# Patient Record
Sex: FEMALE | Race: BLACK OR AFRICAN AMERICAN | Employment: UNEMPLOYED | URBAN - METROPOLITAN AREA
[De-identification: names, ages, dates, MRNs, and addresses within clinical notes are randomized per-mention and may not be internally consistent; named-entity substitution may affect disease eponyms.]

---

## 2021-03-08 ENCOUNTER — APPOINTMENT (OUTPATIENT)
Dept: CT IMAGING | Age: 46
End: 2021-03-08
Payer: COMMERCIAL

## 2021-03-08 ENCOUNTER — HOSPITAL ENCOUNTER (EMERGENCY)
Age: 46
Discharge: HOME | End: 2021-03-08
Attending: EMERGENCY MEDICINE
Payer: COMMERCIAL

## 2021-03-08 ENCOUNTER — APPOINTMENT (OUTPATIENT)
Dept: GENERAL RADIOLOGY | Age: 46
End: 2021-03-08
Payer: COMMERCIAL

## 2021-03-08 VITALS
HEART RATE: 104 BPM | SYSTOLIC BLOOD PRESSURE: 139 MMHG | TEMPERATURE: 98.8 F | OXYGEN SATURATION: 100 % | DIASTOLIC BLOOD PRESSURE: 101 MMHG | RESPIRATION RATE: 18 BRPM | WEIGHT: 114 LBS

## 2021-03-08 DIAGNOSIS — M54.12 CERVICAL RADICULOPATHY: Primary | ICD-10-CM

## 2021-03-08 PROCEDURE — 93005 ELECTROCARDIOGRAM TRACING: CPT | Performed by: STUDENT IN AN ORGANIZED HEALTH CARE EDUCATION/TRAINING PROGRAM

## 2021-03-08 PROCEDURE — 72125 CT NECK SPINE W/O DYE: CPT

## 2021-03-08 PROCEDURE — 73030 X-RAY EXAM OF SHOULDER: CPT

## 2021-03-08 PROCEDURE — 73110 X-RAY EXAM OF WRIST: CPT

## 2021-03-08 PROCEDURE — 99283 EMERGENCY DEPT VISIT LOW MDM: CPT

## 2021-03-08 PROCEDURE — 6370000000 HC RX 637 (ALT 250 FOR IP): Performed by: STUDENT IN AN ORGANIZED HEALTH CARE EDUCATION/TRAINING PROGRAM

## 2021-03-08 RX ORDER — GABAPENTIN 100 MG/1
100 CAPSULE ORAL 2 TIMES DAILY
Qty: 12 CAPSULE | Refills: 0 | Status: SHIPPED | OUTPATIENT
Start: 2021-03-08 | End: 2022-07-18

## 2021-03-08 RX ORDER — CYCLOBENZAPRINE HCL 10 MG
10 TABLET ORAL ONCE
Status: COMPLETED | OUTPATIENT
Start: 2021-03-08 | End: 2021-03-08

## 2021-03-08 RX ADMIN — CYCLOBENZAPRINE 10 MG: 10 TABLET, FILM COATED ORAL at 12:37

## 2021-03-08 ASSESSMENT — ENCOUNTER SYMPTOMS
VOMITING: 0
SHORTNESS OF BREATH: 0
NAUSEA: 0
SORE THROAT: 0
ABDOMINAL PAIN: 0
RHINORRHEA: 0
PHOTOPHOBIA: 0

## 2021-03-08 ASSESSMENT — PAIN DESCRIPTION - PAIN TYPE: TYPE: ACUTE PAIN

## 2021-03-08 ASSESSMENT — PAIN DESCRIPTION - ORIENTATION: ORIENTATION: LEFT

## 2021-03-08 ASSESSMENT — PAIN DESCRIPTION - DESCRIPTORS: DESCRIPTORS: ACHING

## 2021-03-08 NOTE — ED PROVIDER NOTES
[facility]  ED  Emergency Department Encounter  EmergencyMedicine Resident     Pt Name:Lu Hua  MRN: [id number]  [place] 1975  Date of evaluation: 3/8/21  PCP:  No primary care provider on file. CHIEF COMPLAINT       Chief Complaint   Patient presents with    Shoulder Pain    Wrist Pain       HISTORY OF PRESENT ILLNESS  (Location/Symptom, Timing/Onset, Context/Setting, Quality, Duration, Modifying Factors, Severity.)      Jose Luis Simon is a 50 y.o. female who presents with a 1 week history of left-sided shoulder and arm pain. Patient states that she has been taking it because she is having paresthesias. And lancinating type sharp pain patient is been taking ibuprofen and Tylenol with minimal improvement. Patient denies any trauma she denies any neck pain any fevers any chills chest pain or shortness of breath. Not any instrumentation on her spine no IV drug use no history of steroids or malignancy. PAST MEDICAL / SURGICAL / SOCIAL / FAMILY HISTORY      has no past medical history on file. has no past surgical history on file.   Ovarian cysts    Social History     Socioeconomic History    Marital status: Unknown     Spouse name: Not on file    Number of children: Not on file    Years of education: Not on file    Highest education level: Not on file   Occupational History    Not on file   Social Needs    Financial resource strain: Not on file    Food insecurity     Worry: Not on file     Inability: Not on file    Transportation needs     Medical: Not on file     Non-medical: Not on file   Tobacco Use    Smoking status: Not on file   Substance and Sexual Activity    Alcohol use: Not on file    Drug use: Not on file    Sexual activity: Not on file   Lifestyle    Physical activity     Days per week: Not on file     Minutes per session: Not on file    Stress: Not on file   Relationships    Social connections     Talks on phone: Not on file     Gets together: appearing   HENT:      Head: Normocephalic and atraumatic. Right Ear: External ear normal.      Left Ear: External ear normal.      Nose: Nose normal.      Mouth/Throat:      Mouth: Mucous membranes are moist.   Eyes:      Conjunctiva/sclera: Conjunctivae normal.   Neck:      Musculoskeletal: Normal range of motion and neck supple. No neck rigidity. Cardiovascular:      Rate and Rhythm: Regular rhythm. Tachycardia present. Pulses: Normal pulses. Pulmonary:      Effort: Pulmonary effort is normal.      Breath sounds: Normal breath sounds. Abdominal:      Palpations: Abdomen is soft. Tenderness: There is no abdominal tenderness. There is no guarding. Musculoskeletal: Normal range of motion. General: Tenderness present. No swelling. Comments: Negative Belle test negative Adson's test negative Graham's test range of motion of the left shoulder pain on range of motion of the left wrist no ecchymosis no rash no external signs of injury full range of motion of the elbow able to pronate and supinate normally. Lymphadenopathy:      Cervical: No cervical adenopathy. Skin:     General: Skin is warm. Capillary Refill: Capillary refill takes less than 2 seconds. Findings: No bruising, erythema, lesion or rash. Neurological:      Mental Status: She is alert and oriented to person, place, and time. Psychiatric:         Mood and Affect: Mood normal.         DIFFERENTIAL  DIAGNOSIS     PLAN (LABS / IMAGING / EKG):  Orders Placed This Encounter   Procedures    XR SHOULDER LEFT (MIN 2 VIEWS)    XR WRIST LEFT (MIN 3 VIEWS)    CT CERVICAL SPINE WO CONTRAST    EKG 12 Lead       MEDICATIONS ORDERED:  Orders Placed This Encounter   Medications    cyclobenzaprine (FLEXERIL) tablet 10 mg    gabapentin (NEURONTIN) 100 MG capsule     Sig: Take 1 capsule by mouth 2 times daily for 6 days.  Intended supply: 90 days     Dispense:  12 capsule     Refill:  0       DDX: cervical Radiculopathy thoracic outlet syndrome doubt ACS    DIAGNOSTIC RESULTS / EMERGENCY DEPARTMENT COURSE / MDM   LAB RESULTS:  No results found for this visit on 03/08/21. IMPRESSION: Oriented nontoxic uncomfortable appearing 53-year-old female with a 1 week history of paresthesias down her left arm it is circumferential in nature not following a particular dermatomal pattern. Denies any trauma or neck pain full range of motion of her neck no meningeal signs no fevers no constitutional symptoms likely represents a cervical radiculopathy plan be for CT imaging of the neck to evaluate for any disc opinion will obtain x-ray imaging of the shoulder and left wrist flexeril for pain. RADIOLOGY:  Xr Wrist Left (min 3 Views)    Result Date: 3/8/2021  EXAMINATION: 3 XRAY VIEWS OF THE LEFT WRIST 3/8/2021 1:11 pm COMPARISON: None. HISTORY: ORDERING SYSTEM PROVIDED HISTORY: pain TECHNOLOGIST PROVIDED HISTORY: pain FINDINGS: AP, lateral, and oblique views of the wrist are submitted for review. There is no evidence for acute fracture or dislocation. Bony mineralization is normal.  Overlying soft tissues are unremarkable. No radiopaque foreign bodies are identified. No acute osseus abnormality of the wrist.     Ct Cervical Spine Wo Contrast    Result Date: 3/8/2021  EXAMINATION: CT OF THE CERVICAL SPINE WITHOUT CONTRAST 3/8/2021 12:55 pm TECHNIQUE: CT of the cervical spine was performed without the administration of intravenous contrast. Multiplanar reformatted images are provided for review. Dose modulation, iterative reconstruction, and/or weight based adjustment of the mA/kV was utilized to reduce the radiation dose to as low as reasonably achievable. COMPARISON: None.  HISTORY: ORDERING SYSTEM PROVIDED HISTORY: left sided cervical radiculopathy eval for impingement TECHNOLOGIST PROVIDED HISTORY: left sided cervical radiculopathy eval for impingement Decision Support Exception->Emergency Medical Condition (MA) Is the patient worsen    [facility] [place] [address]  [phone]  Call today  for pcp      DISCHARGE MEDICATIONS:  New Prescriptions    GABAPENTIN (NEURONTIN) 100 MG CAPSULE    Take 1 capsule by mouth 2 times daily for 6 days.  Intended supply: 90 days       Marcello Gracia DO  Emergency Medicine Resident    (Please note that portions of thisnote were completed with a voice recognition program.  Efforts were made to edit the dictations but occasionally words are mis-transcribed.)        Marcello Gracia DO  Resident  03/08/21 1671

## 2021-03-08 NOTE — ED NOTES
Pt to ED with complaint of left shoulder pain that radiates down to wrist. Pain is intermittent. Some movement improved the pain. Pt denies fall or injury. Pt denies chest pain, sob, ha, nausea, vertigo. Pt appears uncomfortable.      Jonathan Myers RN  03/08/21 4133

## 2021-03-09 LAB
EKG ATRIAL RATE: 86 BPM
EKG P AXIS: 71 DEGREES
EKG P-R INTERVAL: 160 MS
EKG Q-T INTERVAL: 382 MS
EKG QRS DURATION: 72 MS
EKG QTC CALCULATION (BAZETT): 457 MS
EKG R AXIS: 47 DEGREES
EKG T AXIS: 82 DEGREES
EKG VENTRICULAR RATE: 86 BPM

## 2021-06-13 ENCOUNTER — HOSPITAL ENCOUNTER (EMERGENCY)
Age: 46
Discharge: HOME | End: 2021-06-13
Attending: EMERGENCY MEDICINE
Payer: COMMERCIAL

## 2021-06-13 ENCOUNTER — APPOINTMENT (OUTPATIENT)
Dept: GENERAL RADIOLOGY | Age: 46
End: 2021-06-13
Payer: COMMERCIAL

## 2021-06-13 ENCOUNTER — APPOINTMENT (OUTPATIENT)
Dept: CT IMAGING | Age: 46
End: 2021-06-13
Payer: COMMERCIAL

## 2021-06-13 VITALS
BODY MASS INDEX: 21.71 KG/M2 | RESPIRATION RATE: 26 BRPM | SYSTOLIC BLOOD PRESSURE: 152 MMHG | WEIGHT: 115 LBS | HEIGHT: 61 IN | TEMPERATURE: 97.2 F | OXYGEN SATURATION: 100 % | HEART RATE: 77 BPM | DIASTOLIC BLOOD PRESSURE: 92 MMHG

## 2021-06-13 DIAGNOSIS — R07.9 LEFT-SIDED CHEST PAIN: Primary | ICD-10-CM

## 2021-06-13 LAB
ABSOLUTE EOS #: 0.58 K/UL (ref 0–0.44)
ABSOLUTE IMMATURE GRANULOCYTE: <0.03 K/UL (ref 0–0.3)
ABSOLUTE LYMPH #: 3.18 K/UL (ref 1.1–3.7)
ABSOLUTE MONO #: 0.78 K/UL (ref 0.1–1.2)
ANION GAP SERPL CALCULATED.3IONS-SCNC: 11 MMOL/L (ref 9–17)
BASOPHILS # BLD: 1 % (ref 0–2)
BASOPHILS ABSOLUTE: 0.06 K/UL (ref 0–0.2)
BUN BLDV-MCNC: 13 MG/DL (ref 6–20)
BUN/CREAT BLD: ABNORMAL (ref 9–20)
CALCIUM SERPL-MCNC: 9 MG/DL (ref 8.6–10.4)
CHLORIDE BLD-SCNC: 106 MMOL/L (ref 98–107)
CO2: 19 MMOL/L (ref 20–31)
CREAT SERPL-MCNC: 0.72 MG/DL (ref 0.5–0.9)
D-DIMER QUANTITATIVE: 0.51 MG/L FEU
DIFFERENTIAL TYPE: ABNORMAL
EOSINOPHILS RELATIVE PERCENT: 8 % (ref 1–4)
GFR AFRICAN AMERICAN: >60 ML/MIN
GFR NON-AFRICAN AMERICAN: >60 ML/MIN
GFR SERPL CREATININE-BSD FRML MDRD: ABNORMAL ML/MIN/{1.73_M2}
GFR SERPL CREATININE-BSD FRML MDRD: ABNORMAL ML/MIN/{1.73_M2}
GLUCOSE BLD-MCNC: 92 MG/DL (ref 70–99)
HCT VFR BLD CALC: 42 % (ref 36.3–47.1)
HEMOGLOBIN: 14.6 G/DL (ref 11.9–15.1)
IMMATURE GRANULOCYTES: 0 %
LYMPHOCYTES # BLD: 42 % (ref 24–43)
MCH RBC QN AUTO: 32.3 PG (ref 25.2–33.5)
MCHC RBC AUTO-ENTMCNC: 34.8 G/DL (ref 28.4–34.8)
MCV RBC AUTO: 92.9 FL (ref 82.6–102.9)
MONOCYTES # BLD: 11 % (ref 3–12)
NRBC AUTOMATED: 0 PER 100 WBC
PDW BLD-RTO: 12.7 % (ref 11.8–14.4)
PLATELET # BLD: 320 K/UL (ref 138–453)
PLATELET ESTIMATE: ABNORMAL
PMV BLD AUTO: 9.8 FL (ref 8.1–13.5)
POTASSIUM SERPL-SCNC: 4 MMOL/L (ref 3.7–5.3)
RBC # BLD: 4.52 M/UL (ref 3.95–5.11)
RBC # BLD: ABNORMAL 10*6/UL
SEG NEUTROPHILS: 38 % (ref 36–65)
SEGMENTED NEUTROPHILS ABSOLUTE COUNT: 2.8 K/UL (ref 1.5–8.1)
SODIUM BLD-SCNC: 136 MMOL/L (ref 135–144)
TROPONIN INTERP: NORMAL
TROPONIN T: NORMAL NG/ML
TROPONIN, HIGH SENSITIVITY: <6 NG/L (ref 0–14)
WBC # BLD: 7.4 K/UL (ref 3.5–11.3)
WBC # BLD: ABNORMAL 10*3/UL

## 2021-06-13 PROCEDURE — 71260 CT THORAX DX C+: CPT

## 2021-06-13 PROCEDURE — 84484 ASSAY OF TROPONIN QUANT: CPT

## 2021-06-13 PROCEDURE — 99284 EMERGENCY DEPT VISIT MOD MDM: CPT

## 2021-06-13 PROCEDURE — 80048 BASIC METABOLIC PNL TOTAL CA: CPT

## 2021-06-13 PROCEDURE — 85379 FIBRIN DEGRADATION QUANT: CPT

## 2021-06-13 PROCEDURE — 6360000004 HC RX CONTRAST MEDICATION: Performed by: STUDENT IN AN ORGANIZED HEALTH CARE EDUCATION/TRAINING PROGRAM

## 2021-06-13 PROCEDURE — 85025 COMPLETE CBC W/AUTO DIFF WBC: CPT

## 2021-06-13 PROCEDURE — 6360000002 HC RX W HCPCS: Performed by: STUDENT IN AN ORGANIZED HEALTH CARE EDUCATION/TRAINING PROGRAM

## 2021-06-13 PROCEDURE — 93005 ELECTROCARDIOGRAM TRACING: CPT | Performed by: STUDENT IN AN ORGANIZED HEALTH CARE EDUCATION/TRAINING PROGRAM

## 2021-06-13 PROCEDURE — 71046 X-RAY EXAM CHEST 2 VIEWS: CPT

## 2021-06-13 PROCEDURE — 96374 THER/PROPH/DIAG INJ IV PUSH: CPT

## 2021-06-13 PROCEDURE — 96375 TX/PRO/DX INJ NEW DRUG ADDON: CPT

## 2021-06-13 RX ORDER — IBUPROFEN 600 MG/1
600 TABLET ORAL EVERY 6 HOURS PRN
Qty: 15 TABLET | Refills: 0 | Status: SHIPPED | OUTPATIENT
Start: 2021-06-13 | End: 2022-08-17

## 2021-06-13 RX ORDER — KETOROLAC TROMETHAMINE 15 MG/ML
15 INJECTION, SOLUTION INTRAMUSCULAR; INTRAVENOUS ONCE
Status: COMPLETED | OUTPATIENT
Start: 2021-06-13 | End: 2021-06-13

## 2021-06-13 RX ORDER — FENTANYL CITRATE 50 UG/ML
50 INJECTION, SOLUTION INTRAMUSCULAR; INTRAVENOUS ONCE
Status: COMPLETED | OUTPATIENT
Start: 2021-06-13 | End: 2021-06-13

## 2021-06-13 RX ORDER — LIDOCAINE 50 MG/G
1 PATCH TOPICAL DAILY
Qty: 10 PATCH | Refills: 0 | Status: SHIPPED | OUTPATIENT
Start: 2021-06-13 | End: 2021-06-23

## 2021-06-13 RX ORDER — ACETAMINOPHEN 325 MG/1
650 TABLET ORAL EVERY 6 HOURS PRN
Qty: 20 TABLET | Refills: 0 | Status: SHIPPED | OUTPATIENT
Start: 2021-06-13

## 2021-06-13 RX ADMIN — KETOROLAC TROMETHAMINE 15 MG: 15 INJECTION, SOLUTION INTRAMUSCULAR; INTRAVENOUS at 14:56

## 2021-06-13 RX ADMIN — FENTANYL CITRATE 50 MCG: 50 INJECTION, SOLUTION INTRAMUSCULAR; INTRAVENOUS at 16:02

## 2021-06-13 RX ADMIN — IOPAMIDOL 75 ML: 755 INJECTION, SOLUTION INTRAVENOUS at 17:11

## 2021-06-13 ASSESSMENT — PAIN DESCRIPTION - PAIN TYPE: TYPE: ACUTE PAIN

## 2021-06-13 ASSESSMENT — PAIN SCALES - GENERAL
PAINLEVEL_OUTOF10: 9
PAINLEVEL_OUTOF10: 10
PAINLEVEL_OUTOF10: 9

## 2021-06-13 ASSESSMENT — ENCOUNTER SYMPTOMS
NAUSEA: 0
VOMITING: 0
DIARRHEA: 0
WHEEZING: 0
COUGH: 0
ABDOMINAL PAIN: 0
SHORTNESS OF BREATH: 1

## 2021-06-13 ASSESSMENT — PAIN DESCRIPTION - ORIENTATION: ORIENTATION: LEFT

## 2021-06-13 ASSESSMENT — PAIN DESCRIPTION - LOCATION: LOCATION: RIB CAGE

## 2021-06-13 NOTE — ED PROVIDER NOTES
[facility]  Emergency Department  Emergency Medicine Resident Sign-out     Care of Rebecca Tran was assumed from Dr. Smitha Romero and is being seen for Rib Pain (Left side rib pain, states \"It feels like my meat is caught or something\". Pt reports pain when she takes a deep breath) and Shortness of Breath  . The patient's initial evaluation and plan have been discussed with the prior provider who initially evaluated the patient. EMERGENCY DEPARTMENT COURSE / MEDICAL DECISION MAKING:       MEDICATIONS GIVEN:  Orders Placed This Encounter   Medications    ketorolac (TORADOL) injection 15 mg    fentaNYL (SUBLIMAZE) injection 50 mcg    acetaminophen (TYLENOL) 325 MG tablet     Sig: Take 2 tablets by mouth every 6 hours as needed for Pain     Dispense:  20 tablet     Refill:  0    ibuprofen (IBU) 600 MG tablet     Sig: Take 1 tablet by mouth every 6 hours as needed for Pain     Dispense:  15 tablet     Refill:  0    lidocaine (LIDODERM) 5 %     Sig: Place 1 patch onto the skin daily for 10 days 12 hours on, 12 hours off. Dispense:  10 patch     Refill:  0    iopamidol (ISOVUE-370) 76 % injection 75 mL       LABS / RADIOLOGY:     Labs Reviewed   CBC WITH AUTO DIFFERENTIAL - Abnormal; Notable for the following components:       Result Value    Eosinophils % 8 (*)     Absolute Eos # 0.58 (*)     All other components within normal limits   BASIC METABOLIC PANEL W/ REFLEX TO MG FOR LOW K - Abnormal; Notable for the following components:    CO2 19 (*)     All other components within normal limits   TROPONIN   D-DIMER, QUANTITATIVE       XR CHEST (2 VW)    Result Date: 6/13/2021  EXAMINATION: TWO XRAY VIEWS OF THE CHEST 6/13/2021 2:16 pm COMPARISON: None.  HISTORY: ORDERING SYSTEM PROVIDED HISTORY: chest pain left lateral, rule out pneumonia vs rib fx vs pneumothorax TECHNOLOGIST PROVIDED HISTORY: chest pain left lateral, rule out pneumonia vs rib fx vs pneumothorax FINDINGS: Upright frontal and lateral view chest radiographs were obtained. The heart size, mediastinal contour and pleural spaces are within normal limits. The lungs are clear. There is no focal consolidation or pneumothorax. The pulmonary vascular pattern is within normal limits. No significant thoracic osseous abnormality. Clear lungs. No acute cardiopulmonary abnormality. RECENT VITALS:     Temp: 97.2 °F (36.2 °C),  Pulse: 77, Resp: 26, BP: (!) 152/92, SpO2: 100 %      This patient is a 48 y.o. Female with left rib/side being. Ongoing for several days. Work-up thus far unremarkable however she is on estradiol after total hysterectomy. Awaiting CT scan to rule out pulmonary embolism at this time. Plan is for discharge if CT is negative. CT scan is unremarkable. Will discharge patient with nonnarcotic analgesia, incentive spirometer. Discussed supportive care measures. Strict return precautions given. Patient instructed to follow with her primary care provider as soon as possible for follow up. Patient and/or family expressed understanding and agreement with this plan. ED Course as of Jun 13 1955   Sun Jun 13, 2021   1500 WBC: 7.4 [ZT]   1500 Hemoglobin Quant: 14.6 [ZT]   1646 BMP unremarkable. Normal renal function. No electrolyte abnormalities. Troponin less than 6. Patient overall is low risk. Repeat troponin is not indicated. D-dimer slightly elevated 0.51. CT PE pending. No leukocytosis. Normal hemoglobin. No significant improvement after Toradol was given fentanyl. [ZT]   3827 Chest x-ray unremarkable. No pneumothorax. No pneumonia. [ZT]      ED Course User Index  [ZT] Amanda Tate DO       OUTSTANDING TASKS / RECOMMENDATIONS:    1. F/u CT     FINAL IMPRESSION:     1.  Left-sided chest pain        DISPOSITION:         DISPOSITION:  [x]  Discharge   []  Transfer -    []  Admission -     []  Against Medical Advice   []  Eloped   FOLLOW-UP: Viola Christianson MD  [address] [place] [place] [address]  [phone]    Schedule an appointment as soon as possible for a visit       [facility] ED  [address] [facility] [address]  [phone]    As needed, If symptoms worsen     DISCHARGE MEDICATIONS: Discharge Medication List as of 6/13/2021  6:29 PM      START taking these medications    Details   acetaminophen (TYLENOL) 325 MG tablet Take 2 tablets by mouth every 6 hours as needed for Pain, Disp-20 tablet, R-0Print      ibuprofen (IBU) 600 MG tablet Take 1 tablet by mouth every 6 hours as needed for Pain, Disp-15 tablet, R-0Print      lidocaine (LIDODERM) 5 % Place 1 patch onto the skin daily for 10 days 12 hours on, 12 hours off., Disp-10 patch, R-0Print                Baron Limon,   Emergency Medicine Resident  Veverly Salvatore Limon, [address] [place] [address]  Resident  06/13/21 6716

## 2021-06-13 NOTE — ED PROVIDER NOTES
Faculty Sign-Out Attestation  Handoff taken on the following patient from prior Attending Physician: Sandra Yuen    I was available and discussed any additional care issues that arose and coordinated the management plans with the resident(s) caring for the patient during my duty period. Any areas of disagreement with residents documentation of care or procedures are noted on the chart. I was personally present for the key portions of any/all procedures during my duty period. I have documented in the chart those procedures where I was not present during the key portions. CT CHEST PULMONARY EMBOLISM W CONTRAST   Final Result   No evidence of pulmonary embolism . Mild linear atelectatic change of the left lung base with adjacent small left   pleural effusion. Multiple subcentimeter cystic changes are noted within the right lung. .         XR CHEST (2 VW)   Final Result   Clear lungs. No acute cardiopulmonary abnormality.                   Daria Dandy, MD  Attending Physician       Daria Dandy, MD  06/13/21 3505

## 2021-06-13 NOTE — ED PROVIDER NOTES
[facility]  ED  Emergency Department  Senior Resident Attestation     Primary Care Physician  Rich Ty MD    I performed a history and physical examination of the patient and discussed management with the chong resident. I reviewed the chong residents note and agree with the documented findings and plan of care. Any areas of disagreement are noted on the chart. Case was then discussed with Faculty Attending Supervisor for additional medical management. PERTINENT ATTENDING PHYSICIAN COMMENTS:    HISTORY:   Shanna Tellez is a 48 y.o. female who  has no past medical history on file. and presents with complaint of left-sided chest pain. Patient states that this is been ongoing and progressively getting worse over the last 5 days. Patient stated it \"feels as if her meat is caught\". Pain is worse with movement, palpation, cough, laughing. Denies any history of DVT/PE, denies any risk factors except she does take estradiol. She denies any preceding viral URI. Denies any sick contacts, denies loss of taste or smell, denies trauma.     PHYSICAL:   Temp: 97.2 °F (36.2 °C),  Pulse: 90, Resp: 18, BP: (!) 152/92, SpO2: 99 %  Gen: Non-toxic, Afebrile, appears uncomfortable  Neck: Supple  Cards: Regular rate and rhythm, reproducible chest pain over the left ribs, no paradoxical movement, crepitus  Pulm: Lung sounds clear to auscultation  Abdomen: Soft, non-tender, non-distended  Skin: warm, dry  Extremities: pulses 2+ radial / dorsalis pedis, no clubbing, cyanosis, edema, no calf tenderness    IMPRESSION:   Left-sided pleuritic chest pain    PLAN:   Cardiac labs, D-dimer, pain control, chest x-ray, EKG    CRITICAL CARE TIME:    None    Makeda Bowman DO  Senior Resident Physician    (Please note that portions of this note were completed with a voice recognition program.  Efforts were made to edit the dictations but occasionally words are mis-transcribed.)       Makeda Bowman DO  Resident  06/13/21 1522

## 2021-06-13 NOTE — ED PROVIDER NOTES
[facility]  ED  Emergency Department Encounter  EmergencyMedicine Resident     Pt Name:Lu Castellanos  MRN: [id number]  [place] 1975  Date of evaluation: 6/13/21  PCP:  Logan Goodell, MD    [address] [place] [address]       Chief Complaint   Patient presents with    Rib Pain     Left side rib pain, states \"It feels like my meat is caught or something\". Pt reports pain when she takes a deep breath    Shortness of Breath       HISTORY OF PRESENT ILLNESS  (Location/Symptom, Timing/Onset, Context/Setting, Quality, Duration, Modifying Factors, Severity.)    This patient was evaluated in the Emergency Department for symptoms described in the history of present illness. He/she was evaluated in the context of the global COVID-19 pandemic, which necessitated consideration that the patient might be at risk for infection with the SARS-CoV-2 virus that causes COVID-19. Institutional protocols and algorithms that pertain to the evaluation of patients at risk for COVID-19 are in a state of rapid change based on information released by regulatory bodies including the CDC and federal and state organizations. These policies and algorithms were followed during the patient's care in the ED. Bishop Danielle is a 48 y.o. female who was present emergency department today with complaints of left lateral chest wall pain is been present in progressively worsening over the past 5 days. Patient describes it as \"feeling as if her meat is caught. \"  Worse with movement and palpation. Also worse with inspiration and coughing. Has never had pain like this before. Described as sharp, moderate to severe in severity. No recent illness. No associated productive cough or rash. History of chickenpox as a child. No COVID-19 exposure. No history of PE or DVT but does take estradiol. PAST MEDICAL / SURGICAL / SOCIAL / FAMILY HISTORY      has no past medical history on file.      has a past surgical history that includes 12 hours on, 12 hours off. 6/13/21 6/23/21 Yes Homero Portillo DO   gabapentin (NEURONTIN) 100 MG capsule Take 1 capsule by mouth 2 times daily for 6 days. Intended supply: 90 days 3/8/21 3/14/21  Atul Tellez DO       REVIEW OF SYSTEMS    (2-9 systems for level 4, 10 or more for level 5)      Review of Systems   Constitutional: Negative for chills and fever. HENT: Negative for congestion. Eyes: Negative for visual disturbance. Respiratory: Positive for shortness of breath. Negative for cough and wheezing. Cardiovascular: Positive for chest pain. Negative for leg swelling. Gastrointestinal: Negative for abdominal pain, diarrhea, nausea and vomiting. Genitourinary: Negative for dysuria and hematuria. Musculoskeletal: Positive for neck pain (Chronic). Negative for neck stiffness. Skin: Negative for rash. Neurological: Negative for dizziness, weakness, numbness and headaches. PHYSICAL EXAM   (up to 7 for level 4, 8 or more for level 5)      INITIAL VITALS:   BP (!) 152/92   Pulse 77   Temp 97.2 °F (36.2 °C) (Oral)   Resp 26   Ht 5' 1\" (1.549 m)   Wt 115 lb (52.2 kg)   SpO2 100%   BMI 21.73 kg/m²     Physical Exam  Vitals reviewed. Constitutional:       General: She is not in acute distress. Appearance: She is ill-appearing. She is not toxic-appearing. HENT:      Head: Normocephalic and atraumatic. Nose: Nose normal.      Mouth/Throat:      Mouth: Mucous membranes are moist.   Eyes:      Extraocular Movements: Extraocular movements intact. Conjunctiva/sclera: Conjunctivae normal.   Cardiovascular:      Rate and Rhythm: Normal rate and regular rhythm. Pulses: Normal pulses. Pulmonary:      Effort: No respiratory distress. Breath sounds: No stridor. No wheezing, rhonchi or rales. Comments: Shallow inspiration limits bilaterally. No increased work of breathing. Bilateral breath sounds. Good lung sliding on point-of-care ultrasound.   Abdominal: General: There is no distension. Palpations: Abdomen is soft. Tenderness: There is no abdominal tenderness. There is no guarding or rebound. Musculoskeletal:         General: No swelling or deformity. Normal range of motion. Cervical back: Normal range of motion. No muscular tenderness. Comments: No peripheral edema or calf tenderness. Mild left-sided chest wall tenderness. No deformity. No flail chest.   Skin:     General: Skin is warm and dry. Findings: No rash. Comments: No overlying rash left chest wall. Neurological:      Mental Status: She is alert and oriented to person, place, and time. Psychiatric:         Behavior: Behavior normal.         DIFFERENTIAL  DIAGNOSIS     PLAN (LABS / IMAGING / EKG):  Orders Placed This Encounter   Procedures    XR CHEST (2 VW)    CT CHEST PULMONARY EMBOLISM W CONTRAST    CBC Auto Differential    Basic Metabolic Panel w/ Reflex to MG    Troponin    D-DIMER, QUANTITATIVE    EKG 12 Lead       MEDICATIONS ORDERED:  Orders Placed This Encounter   Medications    ketorolac (TORADOL) injection 15 mg    fentaNYL (SUBLIMAZE) injection 50 mcg    acetaminophen (TYLENOL) 325 MG tablet     Sig: Take 2 tablets by mouth every 6 hours as needed for Pain     Dispense:  20 tablet     Refill:  0    ibuprofen (IBU) 600 MG tablet     Sig: Take 1 tablet by mouth every 6 hours as needed for Pain     Dispense:  15 tablet     Refill:  0    lidocaine (LIDODERM) 5 %     Sig: Place 1 patch onto the skin daily for 10 days 12 hours on, 12 hours off.      Dispense:  10 patch     Refill:  0         DIAGNOSTIC RESULTS / EMERGENCY DEPARTMENT COURSE / MDM     Results for orders placed or performed during the hospital encounter of 06/13/21   CBC Auto Differential   Result Value Ref Range    WBC 7.4 3.5 - 11.3 k/uL    RBC 4.52 3.95 - 5.11 m/uL    Hemoglobin 14.6 11.9 - 15.1 g/dL    Hematocrit 42.0 36.3 - 47.1 %    MCV 92.9 82.6 - 102.9 fL    MCH 32.3 25.2 - 33.5 pg    MCHC 34.8 28.4 - 34.8 g/dL    RDW 12.7 11.8 - 14.4 %    Platelets 677 010 - 898 k/uL    MPV 9.8 8.1 - 13.5 fL    NRBC Automated 0.0 0.0 per 100 WBC    Differential Type NOT REPORTED     Seg Neutrophils 38 36 - 65 %    Lymphocytes 42 24 - 43 %    Monocytes 11 3 - 12 %    Eosinophils % 8 (H) 1 - 4 %    Basophils 1 0 - 2 %    Immature Granulocytes 0 0 %    Segs Absolute 2.80 1.50 - 8.10 k/uL    Absolute Lymph # 3.18 1.10 - 3.70 k/uL    Absolute Mono # 0.78 0.10 - 1.20 k/uL    Absolute Eos # 0.58 (H) 0.00 - 0.44 k/uL    Basophils Absolute 0.06 0.00 - 0.20 k/uL    Absolute Immature Granulocyte <0.03 0.00 - 0.30 k/uL    WBC Morphology NOT REPORTED     RBC Morphology NOT REPORTED     Platelet Estimate NOT REPORTED    Basic Metabolic Panel w/ Reflex to MG   Result Value Ref Range    Glucose 92 70 - 99 mg/dL    BUN 13 6 - 20 mg/dL    CREATININE 0.72 0.50 - 0.90 mg/dL    Bun/Cre Ratio NOT REPORTED 9 - 20    Calcium 9.0 8.6 - 10.4 mg/dL    Sodium 136 135 - 144 mmol/L    Potassium 4.0 3.7 - 5.3 mmol/L    Chloride 106 98 - 107 mmol/L    CO2 19 (L) 20 - 31 mmol/L    Anion Gap 11 9 - 17 mmol/L    GFR Non-African American >60 >60 mL/min    GFR African American >60 >60 mL/min    GFR Comment          GFR Staging NOT REPORTED    Troponin   Result Value Ref Range    Troponin, High Sensitivity <6 0 - 14 ng/L    Troponin T NOT REPORTED <0.03 ng/mL    Troponin Interp NOT REPORTED    D-DIMER, QUANTITATIVE   Result Value Ref Range    D-Dimer, Quant 0.51 mg/L FEU         RADIOLOGY:  XR CHEST (2 VW)   Final Result   Clear lungs. No acute cardiopulmonary abnormality.             CT CHEST PULMONARY EMBOLISM W CONTRAST    (Results Pending)        EKG  EKG Interpretation    Interpreted by me    Rhythm: normal sinus   Rate: normal  Axis: normal  Ectopy: none  Conduction: normal  ST Segments: no acute change  T Waves: no acute change  Q Waves: none    Clinical Impression: no acute changes and normal EKG    All EKG's are interpreted by the [ZT]   1646 BMP unremarkable. Normal renal function. No electrolyte abnormalities. Troponin less than 6. Patient overall is low risk. Repeat troponin is not indicated. D-dimer slightly elevated 0.51. CT PE pending. No leukocytosis. Normal hemoglobin. No significant improvement after Toradol was given fentanyl. [ZT]   3056 Chest x-ray unremarkable. No pneumothorax. No pneumonia. [ZT]      ED Course User Index  [ZT] Saji Osorio DO     Discussed with patient plan for discharge if negative CT PE study. She is agreeable. Pain is well controlled at this time. Plan to discharge on multimodal pain control with Tylenol, Motrin, lidocaine patch. Encourage incentive spirometry and follow-up with PCP. Signed out to Dr. Ramy Ghosh. FINAL IMPRESSION      1. Left-sided chest pain          DISPOSITION / PLAN     DISPOSITION        PATIENT REFERRED TO:  Pk Graves MD  [address] [place] [address]  [phone]    Schedule an appointment as soon as possible for a visit       [facility] ED  [address] [place] [address]  [phone]    As needed, If symptoms worsen      DISCHARGE MEDICATIONS:  New Prescriptions    ACETAMINOPHEN (TYLENOL) 325 MG TABLET    Take 2 tablets by mouth every 6 hours as needed for Pain    IBUPROFEN (IBU) 600 MG TABLET    Take 1 tablet by mouth every 6 hours as needed for Pain    LIDOCAINE (LIDODERM) 5 %    Place 1 patch onto the skin daily for 10 days 12 hours on, 12 hours off.        Saji Osorio DO  Emergency Medicine Resident    (Please note that portions of thisnote were completed with a voice recognition program.  Efforts were made to edit the dictations but occasionally words are mis-transcribed.)       Saji Osorio DO  Resident  06/13/21 1693

## 2021-06-13 NOTE — ED NOTES
Pt arrived to ED alert and oriented x4. Pt c/o rib pain and SOB. Pt reports left side rib pain, denies injury. Pt reports \"I feels like my meat is caught or something\". Pt reports pain when she takes a deep breath. Pt reports being recent seen for a pinched nerve. Pt denies having been around anyone suspected to have COVID-19 or anyone that has been sick, denies recent travel outside the [facility] [place] or [address]. RR even and unlabored. NAD noted. Will continue with plan of care.      Helder Gaitan RN  06/13/21 1508

## 2021-06-14 LAB
EKG ATRIAL RATE: 96 BPM
EKG P AXIS: 66 DEGREES
EKG P-R INTERVAL: 144 MS
EKG Q-T INTERVAL: 352 MS
EKG QRS DURATION: 78 MS
EKG QTC CALCULATION (BAZETT): 444 MS
EKG R AXIS: 67 DEGREES
EKG T AXIS: 51 DEGREES
EKG VENTRICULAR RATE: 96 BPM

## 2021-06-14 PROCEDURE — 93010 ELECTROCARDIOGRAM REPORT: CPT | Performed by: INTERNAL MEDICINE

## 2022-07-18 ENCOUNTER — OFFICE VISIT (OUTPATIENT)
Dept: PRIMARY CARE CLINIC | Age: 47
End: 2022-07-18
Payer: COMMERCIAL

## 2022-07-18 VITALS
HEIGHT: 61 IN | HEART RATE: 79 BPM | SYSTOLIC BLOOD PRESSURE: 158 MMHG | BODY MASS INDEX: 23.03 KG/M2 | DIASTOLIC BLOOD PRESSURE: 93 MMHG | OXYGEN SATURATION: 98 % | WEIGHT: 122 LBS

## 2022-07-18 DIAGNOSIS — R10.2 PELVIC PAIN: Primary | ICD-10-CM

## 2022-07-18 LAB
BILIRUBIN, POC: NORMAL
BLOOD URINE, POC: NORMAL
CLARITY, POC: CLEAR
COLOR, POC: YELLOW
GLUCOSE URINE, POC: NORMAL
KETONES, POC: NORMAL
LEUKOCYTE EST, POC: NORMAL
NITRITE, POC: NORMAL
PH, POC: 6
PROTEIN, POC: NORMAL
SPECIFIC GRAVITY, POC: 1.01
UROBILINOGEN, POC: 0.2

## 2022-07-18 PROCEDURE — 81003 URINALYSIS AUTO W/O SCOPE: CPT

## 2022-07-18 PROCEDURE — 99204 OFFICE O/P NEW MOD 45 MIN: CPT

## 2022-07-18 RX ORDER — TRAMADOL HYDROCHLORIDE 50 MG/1
50 TABLET ORAL EVERY 6 HOURS PRN
Qty: 20 TABLET | Refills: 0 | Status: SHIPPED | OUTPATIENT
Start: 2022-07-18 | End: 2022-07-18 | Stop reason: CLARIF

## 2022-07-18 RX ORDER — TRAMADOL HYDROCHLORIDE 50 MG/1
50 TABLET ORAL EVERY 6 HOURS PRN
Qty: 20 TABLET | Refills: 0 | Status: SHIPPED | OUTPATIENT
Start: 2022-07-18 | End: 2022-07-23

## 2022-07-18 ASSESSMENT — PATIENT HEALTH QUESTIONNAIRE - PHQ9
SUM OF ALL RESPONSES TO PHQ QUESTIONS 1-9: 0
SUM OF ALL RESPONSES TO PHQ9 QUESTIONS 1 & 2: 0
SUM OF ALL RESPONSES TO PHQ QUESTIONS 1-9: 0
2. FEELING DOWN, DEPRESSED OR HOPELESS: 0
1. LITTLE INTEREST OR PLEASURE IN DOING THINGS: 0
SUM OF ALL RESPONSES TO PHQ QUESTIONS 1-9: 0
SUM OF ALL RESPONSES TO PHQ QUESTIONS 1-9: 0

## 2022-07-18 ASSESSMENT — ENCOUNTER SYMPTOMS
CONSTIPATION: 0
NAUSEA: 0
CHEST TIGHTNESS: 0
ABDOMINAL PAIN: 1
BACK PAIN: 1
FACIAL SWELLING: 0
SHORTNESS OF BREATH: 0
SORE THROAT: 0
DIARRHEA: 0

## 2022-07-18 NOTE — PROGRESS NOTES
[facility] [address] [place] [address] IN CARE  [address] [address]  Dept: [phone]  Dept Fax: [phone]    Shwetha Finnegan is a 50 y.o. female who presents to the urgent care today for her medical conditions/complaints as notedbelow. Shwetha Finnegan is c/o of Side Pain (For the last four to five days.)      HPI:     Patient follows with a OBGYN  Patient had a hysterectomy about 3 years ago, patient has a history of ovarian cysts    Pelvic Pain  The patient's primary symptoms include pelvic pain. The patient's pertinent negatives include no genital itching, genital lesions, genital odor, genital rash, missed menses, vaginal bleeding or vaginal discharge. This is a chronic problem. The current episode started more than 1 year ago. The problem occurs intermittently (recently over the past few days it has gotten way worse). The problem has been waxing and waning. The pain is moderate. The problem affects the right side. Associated symptoms include abdominal pain, back pain and joint swelling. Pertinent negatives include no anorexia, chills, constipation, diarrhea, discolored urine, dysuria, flank pain, frequency, headaches, hematuria, joint pain, nausea, painful intercourse, rash, sore throat or urgency. Nothing aggravates the symptoms. She has tried nothing for the symptoms. She is sexually active. No, her partner does not have an STD. Contraceptive use: hysterectomy. Her past medical history is significant for ovarian cysts. There is no history of an abdominal surgery, an ectopic pregnancy, miscarriage, perineal abscess, PID, an STD, a terminated pregnancy or vaginosis. No past medical history on file. Current Outpatient Medications   Medication Sig Dispense Refill    traMADol (ULTRAM) 50 MG tablet Take 1 tablet by mouth every 6 hours as needed for Pain for up to 5 days.  20 tablet 0    acetaminophen (TYLENOL) 325 MG tablet Take 2 tablets by mouth respiratory distress. Breath sounds: Normal breath sounds. No stridor. No wheezing, rhonchi or rales. Chest:      Chest wall: No tenderness. Abdominal:      General: Abdomen is flat. Bowel sounds are normal. There is no distension. Palpations: Abdomen is soft. There is no shifting dullness, hepatomegaly, splenomegaly or pulsatile mass. Tenderness: There is no abdominal tenderness. There is no right CVA tenderness, left CVA tenderness, guarding or rebound. Negative signs include Cooper's sign, Rovsing's sign and McBurney's sign. Genitourinary:     Comments: Patient was covered during exam, right sided pelvic pain felt on palpation  Musculoskeletal:         General: No swelling, tenderness or signs of injury. Normal range of motion. Cervical back: Normal range of motion and neck supple. No rigidity or tenderness. Right lower leg: No edema. Left lower leg: No edema. Lymphadenopathy:      Cervical: No cervical adenopathy. Skin:     General: Skin is warm and dry. Capillary Refill: Capillary refill takes less than 2 seconds. Findings: No bruising, erythema, lesion or rash. Neurological:      Mental Status: She is alert and oriented to person, place, and time. Motor: No weakness. Coordination: Coordination normal.      Gait: Gait normal.   Psychiatric:         Mood and Affect: Mood normal.         Behavior: Behavior normal.         Thought Content: Thought content normal.         Judgment: Judgment normal.     BP (!) 158/93   Pulse 79   Ht 5' 1\" (1.549 m)   Wt 122 lb (55.3 kg)   SpO2 98%   BMI 23.05 kg/m²     Assessment:       Diagnosis Orders   1.  Pelvic pain  POCT Urinalysis No Micro (Auto)    US PELVIS COMPLETE    traMADol (ULTRAM) 50 MG tablet    DISCONTINUED: traMADol (ULTRAM) 50 MG tablet        Results for POC orders placed in visit on 07/18/22   POCT Urinalysis No Micro (Auto)   Result Value Ref Range    Color, UA yellow     Clarity, UA clear Glucose, UA POC neg     Bilirubin, UA neg     Ketones, UA neg     Spec Grav, UA 1.015     Blood, UA POC neg     pH, UA 6.0     Protein, UA POC neg     Urobilinogen, UA 0.2     Leukocytes, UA neg     Nitrite, UA neg        Plan:   -urinalysis unremarkable, reviewed with patient  -US pelvic for pelvic pain, will call with results  -Advised patient to follow up with OBGYN for further management  -increase fluids and rest  -Avoid spicy/processed foods and carbonated beverages  -Advise to refrain from sex for now  -OARRS reviewed  -tramadol as needed for pain  -May use tylenol and ibuprofen for pain  -Go to ER if pain becomes severe, fever, or SOB  -Patient agreeable with treatment plan  No follow-ups on file. Orders Placed This Encounter   Medications    DISCONTD: traMADol (ULTRAM) 50 MG tablet     Sig: Take 1 tablet by mouth every 6 hours as needed for Pain for up to 5 days. Dispense:  20 tablet     Refill:  0     Reduce doses taken as pain becomes manageable    traMADol (ULTRAM) 50 MG tablet     Sig: Take 1 tablet by mouth every 6 hours as needed for Pain for up to 5 days. Dispense:  20 tablet     Refill:  0     Reduce doses taken as pain becomes manageable           Patient given educational materials - see patient instructions. Discussed use, benefit, and side effects of prescribed medications. All patient questions answered. Pt voiced understanding.     Electronically signed by DAVIN Monson NP on 7/18/2022 at 1:38 PM

## 2022-07-18 NOTE — LETTER
[facility]  [address] [address]  Phone: [phone]  Fax: [phone]    DAVIN Pereira NP        July 18, 2022     Patient: Aubrey Deluca   YOB: 1975   Date of Visit: 7/18/2022       To Whom It May Concern: It is my medical opinion that Larry Fischer may return to work on 7/21/22 . If you have any questions or concerns, please don't hesitate to call.     Sincerely,        DAVIN Pereira NP

## 2022-07-19 ENCOUNTER — HOSPITAL ENCOUNTER (OUTPATIENT)
Dept: ULTRASOUND IMAGING | Age: 47
Discharge: HOME | End: 2022-07-21
Payer: COMMERCIAL

## 2022-07-19 DIAGNOSIS — R10.2 PELVIC PAIN: ICD-10-CM

## 2022-07-19 PROCEDURE — 76856 US EXAM PELVIC COMPLETE: CPT

## 2022-08-17 ENCOUNTER — HOSPITAL ENCOUNTER (OUTPATIENT)
Age: 47
Setting detail: SPECIMEN
Discharge: HOME | End: 2022-08-17

## 2022-08-17 ENCOUNTER — OFFICE VISIT (OUTPATIENT)
Dept: PRIMARY CARE CLINIC | Age: 47
End: 2022-08-17
Payer: COMMERCIAL

## 2022-08-17 VITALS
BODY MASS INDEX: 23.05 KG/M2 | TEMPERATURE: 98.1 F | DIASTOLIC BLOOD PRESSURE: 100 MMHG | OXYGEN SATURATION: 99 % | SYSTOLIC BLOOD PRESSURE: 149 MMHG | WEIGHT: 122 LBS | HEART RATE: 87 BPM

## 2022-08-17 DIAGNOSIS — R10.31 BILATERAL LOWER ABDOMINAL PAIN: ICD-10-CM

## 2022-08-17 DIAGNOSIS — Z12.11 COLON CANCER SCREENING: ICD-10-CM

## 2022-08-17 DIAGNOSIS — Z13.220 SCREENING FOR HYPERLIPIDEMIA: Primary | ICD-10-CM

## 2022-08-17 DIAGNOSIS — R10.2 PELVIC PAIN: ICD-10-CM

## 2022-08-17 DIAGNOSIS — R10.32 BILATERAL LOWER ABDOMINAL PAIN: ICD-10-CM

## 2022-08-17 PROCEDURE — 99214 OFFICE O/P EST MOD 30 MIN: CPT | Performed by: NURSE PRACTITIONER

## 2022-08-17 PROCEDURE — G8420 CALC BMI NORM PARAMETERS: HCPCS | Performed by: NURSE PRACTITIONER

## 2022-08-17 PROCEDURE — 4004F PT TOBACCO SCREEN RCVD TLK: CPT | Performed by: NURSE PRACTITIONER

## 2022-08-17 PROCEDURE — G8427 DOCREV CUR MEDS BY ELIG CLIN: HCPCS | Performed by: NURSE PRACTITIONER

## 2022-08-17 RX ORDER — ESTRADIOL 0.1 MG/D
1 FILM, EXTENDED RELEASE TRANSDERMAL
COMMUNITY

## 2022-08-17 RX ORDER — IBUPROFEN 800 MG/1
TABLET ORAL
COMMUNITY
Start: 2022-07-24

## 2022-08-17 SDOH — ECONOMIC STABILITY: FOOD INSECURITY: WITHIN THE PAST 12 MONTHS, YOU WORRIED THAT YOUR FOOD WOULD RUN OUT BEFORE YOU GOT MONEY TO BUY MORE.: NEVER TRUE

## 2022-08-17 SDOH — ECONOMIC STABILITY: FOOD INSECURITY: WITHIN THE PAST 12 MONTHS, THE FOOD YOU BOUGHT JUST DIDN'T LAST AND YOU DIDN'T HAVE MONEY TO GET MORE.: NEVER TRUE

## 2022-08-17 SDOH — ECONOMIC STABILITY: TRANSPORTATION INSECURITY
IN THE PAST 12 MONTHS, HAS THE LACK OF TRANSPORTATION KEPT YOU FROM MEDICAL APPOINTMENTS OR FROM GETTING MEDICATIONS?: NO

## 2022-08-17 SDOH — ECONOMIC STABILITY: TRANSPORTATION INSECURITY
IN THE PAST 12 MONTHS, HAS LACK OF TRANSPORTATION KEPT YOU FROM MEETINGS, WORK, OR FROM GETTING THINGS NEEDED FOR DAILY LIVING?: NO

## 2022-08-17 ASSESSMENT — ENCOUNTER SYMPTOMS
BLOOD IN STOOL: 0
TROUBLE SWALLOWING: 0
SHORTNESS OF BREATH: 0
VOMITING: 0
WHEEZING: 0
ABDOMINAL PAIN: 1
DIARRHEA: 0

## 2022-08-17 ASSESSMENT — PATIENT HEALTH QUESTIONNAIRE - PHQ9
SUM OF ALL RESPONSES TO PHQ QUESTIONS 1-9: 0
SUM OF ALL RESPONSES TO PHQ QUESTIONS 1-9: 0
2. FEELING DOWN, DEPRESSED OR HOPELESS: 0
SUM OF ALL RESPONSES TO PHQ QUESTIONS 1-9: 0
SUM OF ALL RESPONSES TO PHQ QUESTIONS 1-9: 0
SUM OF ALL RESPONSES TO PHQ9 QUESTIONS 1 & 2: 0
1. LITTLE INTEREST OR PLEASURE IN DOING THINGS: 0

## 2022-08-17 ASSESSMENT — SOCIAL DETERMINANTS OF HEALTH (SDOH): HOW HARD IS IT FOR YOU TO PAY FOR THE VERY BASICS LIKE FOOD, HOUSING, MEDICAL CARE, AND HEATING?: NOT HARD AT ALL

## 2022-08-17 NOTE — PROGRESS NOTES
[facility]  [address] [facility] [address]  Dept: [phone]  Dept Fax: [phone]    Patient Care Team:  DAVIN Kyle - CNP as PCP - General (Family Medicine)    2022    Tyrone Montano (:  1975) amrita 50 y.o. female, here for evaluation of the following medical concerns:   Chief Complaint   Patient presents with    New Patient     [facility]    Back Pain         Following with until [facility] until recently, felt symptoms were not being checked. Generally new to [facility] ER about 2 weeks ago for lower abdominal pain, she had abnormal scan is scheduled to see both urology as well as GI. Needs MRCP. C/O abd pain. Started on the left  3 months ago, now on right about 1 month ago  Lifting and driving, along with coughing increases the pain. Not improved with Bowel movements. No appetite loss, no weight loss  Denies bloody stools or hematuria, no vaginal discharge    Thomas Scott is asking me to complete paperwork for jobs and family services, at this time she is to supposed to work in order to [employer]. The pain limits her from sitting for up to an hour and/or standing for up to an hour. She also finds lifting and or pushing painful. Review of Systems   Constitutional:  Negative for appetite change, fever and unexpected weight change. HENT:  Negative for hearing loss and trouble swallowing. Eyes:  Negative for visual disturbance. Respiratory:  Negative for shortness of breath and wheezing. Cardiovascular:  Negative for chest pain and palpitations. Gastrointestinal:  Positive for abdominal pain. Negative for blood in stool, diarrhea and vomiting. Endocrine: Negative for polydipsia and polyuria. Genitourinary:  Positive for pelvic pain. Negative for frequency, hematuria and menstrual problem. Musculoskeletal:  Negative for myalgias and neck pain.    Neurological: Negative for seizures, numbness and headaches. Psychiatric/Behavioral:  Negative for suicidal ideas. The patient is not nervous/anxious. Prior to Visit Medications    Medication Sig Taking? Authorizing Provider   estradiol (VIVELLE) 0.1 MG/24HR Place 1 patch onto the skin Twice a Week Yes Historical Provider, MD   ibuprofen (ADVIL;MOTRIN) 800 MG tablet take 1 tablet by mouth three times a day Yes Historical Provider, MD   acetaminophen (TYLENOL) 325 MG tablet Take 2 tablets by mouth every 6 hours as needed for Pain Yes Sara Nett, DO        Allergies   Allergen Reactions    Ampicillin        Past Medical History:   Diagnosis Date    Cysts of both ovaries        Past Surgical History:   Procedure Laterality Date     SECTION      HYSTERECTOMY (CERVIX STATUS UNKNOWN)         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    Sexual activity: Not on file   Other Topics Concern    Not on file   Social History Narrative    Not on file     Social Determinants of Health     Financial Resource Strain: Low Risk     Difficulty of Paying Living Expenses: Not hard at all   Food Insecurity: No Food Insecurity    Worried About Running Out of Food in the Last Year: Never true    Ran Out of Food in the Last Year: Never true   Transportation Needs: No Transportation Needs    Lack of Transportation (Medical): No    Lack of Transportation (Non-Medical):  No   Physical Activity: Not on file   Stress: Not on file   Social Connections: Not on file   Intimate Partner Violence: Not on file   Housing Stability: Not on file        Family History   Problem Relation Age of Onset    Heart Disease Mother     Heart Disease Brother 50    Cancer Neg Hx        Vitals:    08/17/22 1414 08/17/22 1420   BP: (!) 160/96 (!) 149/100   Site: Left Upper Arm Right Upper Arm   Position: Sitting Sitting   Pulse: 84 87   Temp: 98.1 °F (36.7 °C)    TempSrc: Infrared    SpO2: 99%    Weight: 122 lb (55.3 kg)      ABDOMEN AND PELVIS CT WITH CONTRAST     HISTORY: Right lower quadrant pain     COMPARISON: None. TECHNIQUE: CT abdomen and pelvis was performed. Axial images were obtained following the uneventful administration of 100 cc Omnipaque 300 nonionic intravenous contrast. Coronal and sagittal reformatted images as well as delayed excretory phase images were obtained and reviewed. Automated exposure   control was utilized     FINDINGS:     ABDOMEN:     Liver, spleen, gallbladder unremarkable. Pancreas has a normal appearing parenchyma. However, the duct is diffusely dilated measuring 5 mm. MRCP or ERCP would be recommended to exclude ampullary lesion. Adrenal glands and kidneys are unremarkable. Incidental small bilateral renal cysts which do    not require follow-up     PELVIS:     No free air or free fluid. No adenopathy. No evidence of bowel obstruction. Evidence of appendectomy. Lumbar Spine is unremarkable     IMPRESSION:     1. No acute pathology. Diffuse dilatation of pancreatic ducts for which ERCPor MRCP is recommended       Estimated body mass index is 23.05 kg/m² as calculated from the following:    Height as of 7/18/22: 5' 1\" (1.549 m). Weight as of this encounter: 122 lb (55.3 kg). Physical Exam  Vitals and nursing note reviewed. Constitutional:       General: She is not in acute distress. Appearance: She is well-developed. HENT:      Head: Normocephalic. Eyes:      General: No scleral icterus.      Pupils: Pupils are equal, round, and reactive to light. Cardiovascular:      Rate and Rhythm: Normal rate and regular rhythm. Pulmonary:      Effort: Pulmonary effort is normal.      Breath sounds: Normal breath sounds. Abdominal:      Palpations: Abdomen is soft. Tenderness: There is no abdominal tenderness. There is no guarding. Musculoskeletal:      Cervical back: Normal range of motion and neck supple. Skin:     General: Skin is warm and dry. Neurological:      Mental Status: She is alert and oriented to person, place, and time. Coordination: Coordination normal.   Psychiatric:         Behavior: Behavior normal. Behavior is cooperative. Thought Content: Thought content normal.         Judgment: Judgment normal.       ASSESSMENT     Diagnosis Orders   1. Screening for hyperlipidemia  Lipid Panel      2. Pelvic pain  Vaginitis DNA Probe      3. Colon cancer screening  FIT-DNA (Cologuard)      4. Bilateral lower abdominal pain               PLAN:  No orders of the defined types were placed in this encounter. Monitor blood pressure, multiple visits with elevated pressure we will likely start blood pressure medication next visit. Alcohol abuse, discussed risks of alcoholic liver disease with patient and encouraged reduction in daily alcohol intake. Patient scheduled to see GI and urology through [address] in the next 2 weeks, plans for MRCP. At this time we will fully evaluate pelvic pain with vaginal swab to rule out infectious pathology. Due for colon cancer screening, Cologuard ordered today. JFS form completed, will allow restrictions for the next 3 months during evaluation with GI    FOLLOW UP AND INSTRUCTIONS:  Return in about 4 weeks (around 9/14/2022) for Pain, bp check. Ana Jones received counseling on the following healthy behaviors:decrease in alcohol consumption    Discussed use, benefit, and side effects of prescribed medications.   Barriers to  medication compliance addressed. All patient questions answered. Pt voiced  understanding. Patient given educational materials - see patient instructions    Patient advised to contact scheduling offices for any referrals or imaging orders  placed today if they have not been contacted in 48 hours. Return in about 4 weeks (around 9/14/2022) for Pain, bp check. An  electronic signature was used to authenticate this note. --DAVIN Altamirano CNP on 8/17/2022 at 4:22 PM  Visit Information    Have you changed or started any medications since your last visit including any over-the-counter medicines, vitamins, or herbal medicines? no   Are you having any side effects from any of your medications? -  no  Have you stopped taking any of your medications? Is so, why? -  no    Have you seen any other physician or provider since your last visit? Yes - Records Obtained  Have you had any other diagnostic tests since your last visit? Yes - Records Obtained  Have you been seen in the emergency room and/or had an admission to a hospital since we last saw you? Yes - Records Obtained  Have you had your routine dental cleaning in the past 6 months? No    Have you activated your [employer] account? If not, what are your barriers?  Yes     Patient Care Team:  DAVIN Altamirano CNP as PCP - General (Family Medicine)    Medical History Review  Past Medical, Family, and Social History reviewed and does not contribute to the patient presenting condition    Health Maintenance   Topic Date Due    COVID-19 Vaccine (1) Never done    HIV screen  Never done    Hepatitis C screen  Never done    DTaP/Tdap/Td vaccine (1 - Tdap) Never done    Cervical cancer screen  Never done    Lipids  Never done    Colorectal Cancer Screen  Never done    Flu vaccine (1) 09/01/2022    Depression Screen  07/18/2023    Hepatitis A vaccine  Aged Out    Hepatitis B vaccine  Aged Out    Hib vaccine  Aged Out    Meningococcal (ACWY) vaccine  Aged Out    Pneumococcal 0-64 years Vaccine  Aged Out

## 2022-08-18 ENCOUNTER — TELEPHONE (OUTPATIENT)
Dept: PRIMARY CARE CLINIC | Age: 47
End: 2022-08-18

## 2022-08-18 DIAGNOSIS — B96.89 BACTERIAL VAGINOSIS: Primary | ICD-10-CM

## 2022-08-18 DIAGNOSIS — N76.0 BACTERIAL VAGINOSIS: Primary | ICD-10-CM

## 2022-08-18 LAB
CANDIDA SPECIES, DNA PROBE: NEGATIVE
GARDNERELLA VAGINALIS, DNA PROBE: POSITIVE
SOURCE: ABNORMAL
TRICHOMONAS VAGINALIS DNA: NEGATIVE

## 2022-08-18 NOTE — TELEPHONE ENCOUNTER
She is to keep both follow-ups, the pelvic pain is likely from the infection but that is not causing the damage to her pancreas or the changes to the outside of her bladder

## 2022-08-18 NOTE — TELEPHONE ENCOUNTER
Writer spoke with patient, Informed of results. Voiced understanding. No questions asked at time of call.

## 2022-08-18 NOTE — TELEPHONE ENCOUNTER
Let patient know their labs show they have bacterial vaginosis , an infection  treated with flagyl  500 mg po bid x7d. prn if symptoms worsen or fail to improve. It is very important that she abstain from sexual intercourse during the week of treatment, otherwise she will reinfect herself and the infection will persist.  This medication does also interact with alcohol, so she may experience more nausea and vomiting. I did complete her form for [employer] and family services, however the bottom of the page does require signature authorizing the we release her medical information to [employer] family [employer].   She does have to come back up to the office to sign the authorization

## 2022-08-18 NOTE — TELEPHONE ENCOUNTER
Pt came to office already to sign papers, paper work has been faxed to [facility] the patient called back to the office wondering if she should continue her appts that she has set up with urology and MRI ordered thru [facility] . The pt states unsure if the bacterial infection is what's infact caused her pain . please advise

## 2022-08-19 RX ORDER — METRONIDAZOLE 500 MG/1
500 TABLET ORAL 2 TIMES DAILY
Qty: 14 TABLET | Refills: 0 | Status: SHIPPED | OUTPATIENT
Start: 2022-08-19 | End: 2022-08-26

## 2022-08-19 NOTE — TELEPHONE ENCOUNTER
Pt informed. Voiced understanding. States Rx for Flagyl was not received at [facility]. Doesn't look like Rx was sent. Please advise.

## 2022-08-30 LAB — NONINV COLON CA DNA+OCC BLD SCRN STL QL: NEGATIVE

## 2022-09-14 ENCOUNTER — OFFICE VISIT (OUTPATIENT)
Dept: PRIMARY CARE CLINIC | Age: 47
End: 2022-09-14
Payer: COMMERCIAL

## 2022-09-14 VITALS
SYSTOLIC BLOOD PRESSURE: 169 MMHG | DIASTOLIC BLOOD PRESSURE: 105 MMHG | BODY MASS INDEX: 23.05 KG/M2 | HEART RATE: 90 BPM | OXYGEN SATURATION: 99 % | WEIGHT: 122 LBS | TEMPERATURE: 97.5 F

## 2022-09-14 DIAGNOSIS — G89.29 CHRONIC PELVIC PAIN IN FEMALE: ICD-10-CM

## 2022-09-14 DIAGNOSIS — R10.2 CHRONIC PELVIC PAIN IN FEMALE: ICD-10-CM

## 2022-09-14 DIAGNOSIS — I10 PRIMARY HYPERTENSION: Primary | ICD-10-CM

## 2022-09-14 PROCEDURE — 99214 OFFICE O/P EST MOD 30 MIN: CPT | Performed by: NURSE PRACTITIONER

## 2022-09-14 PROCEDURE — G8420 CALC BMI NORM PARAMETERS: HCPCS | Performed by: NURSE PRACTITIONER

## 2022-09-14 PROCEDURE — 4004F PT TOBACCO SCREEN RCVD TLK: CPT | Performed by: NURSE PRACTITIONER

## 2022-09-14 PROCEDURE — G8427 DOCREV CUR MEDS BY ELIG CLIN: HCPCS | Performed by: NURSE PRACTITIONER

## 2022-09-14 RX ORDER — PHENAZOPYRIDINE HYDROCHLORIDE 100 MG/1
100 TABLET, FILM COATED ORAL 3 TIMES DAILY PRN
Qty: 9 TABLET | Refills: 0 | Status: SHIPPED | OUTPATIENT
Start: 2022-09-14 | End: 2022-09-17

## 2022-09-14 RX ORDER — LOSARTAN POTASSIUM 50 MG/1
50 TABLET ORAL DAILY
Qty: 90 TABLET | Refills: 1 | Status: SHIPPED | OUTPATIENT
Start: 2022-09-14 | End: 2022-10-12

## 2022-09-14 ASSESSMENT — ENCOUNTER SYMPTOMS
VOMITING: 0
ABDOMINAL PAIN: 1
SHORTNESS OF BREATH: 0
TROUBLE SWALLOWING: 0
WHEEZING: 0
DIARRHEA: 0
BLOOD IN STOOL: 0

## 2022-09-14 NOTE — PROGRESS NOTES
Neurological:  Negative for seizures, numbness and headaches. Psychiatric/Behavioral:  Negative for suicidal ideas. The patient is not nervous/anxious. Prior to Visit Medications    Medication Sig Taking? Authorizing Provider   losartan (COZAAR) 50 MG tablet Take 1 tablet by mouth daily Yes DAVIN Rodriguez CNP   phenazopyridine (PYRIDIUM) 100 MG tablet Take 1 tablet by mouth 3 times daily as needed for Pain Yes DAVIN Rodriguez CNP   estradiol (VIVELLE) 0.1 MG/24HR Place 1 patch onto the skin Twice a Week Yes Historical Provider, MD   ibuprofen (ADVIL;MOTRIN) 800 MG tablet take 1 tablet by mouth three times a day Yes Historical Provider, MD   acetaminophen (TYLENOL) 325 MG tablet Take 2 tablets by mouth every 6 hours as needed for Pain Yes Leatha Pabon,         Social History     Tobacco Use    Smoking status: Every Day     Packs/day: 1.00     Years: 20.00     Pack years: 20.00     Types: Cigarettes    Smokeless tobacco: Never   Substance Use Topics    Alcohol use: Yes     Comment: 2-3 16 oz a day        Vitals:    09/14/22 1416 09/14/22 1512   BP: (!) 158/99 (!) 169/105   Site: Left Upper Arm    Position: Sitting    Pulse: 90    Temp: 97.5 °F (36.4 °C)    TempSrc: Infrared    SpO2: 99%    Weight: 122 lb (55.3 kg)      Estimated body mass index is 23.05 kg/m² as calculated from the following:    Height as of 7/18/22: 5' 1\" (1.549 m). Weight as of this encounter: 122 lb (55.3 kg).     DIAGNOSTIC FINDINGS:  CBC:  Lab Results   Component Value Date/Time    WBC 7.4 06/13/2021 02:47 PM    HGB 14.6 06/13/2021 02:47 PM     06/13/2021 02:47 PM       BMP:    Lab Results   Component Value Date/Time     06/13/2021 02:47 PM    K 4.0 06/13/2021 02:47 PM     06/13/2021 02:47 PM    CO2 19 06/13/2021 02:47 PM    BUN 13 06/13/2021 02:47 PM    CREATININE 0.72 06/13/2021 02:47 PM    GLUCOSE 92 06/13/2021 02:47 PM       HEMOGLOBIN A1C: No results found for: LABA1C    FASTING LIPID PANEL:No results found for: CHOL, HDL, TRIG    Physical Exam  Vitals and nursing note reviewed. Constitutional:       General: She is not in acute distress. Appearance: She is well-developed. She is not ill-appearing. HENT:      Head: Normocephalic. Eyes:      General: No scleral icterus. Pupils: Pupils are equal, round, and reactive to light. Cardiovascular:      Rate and Rhythm: Normal rate and regular rhythm. Pulmonary:      Effort: Pulmonary effort is normal.      Breath sounds: Normal breath sounds. Abdominal:      Palpations: Abdomen is soft. Tenderness: There is no abdominal tenderness. There is no guarding. Musculoskeletal:      Cervical back: Normal range of motion and neck supple. Skin:     General: Skin is warm and dry. Neurological:      Mental Status: She is alert and oriented to person, place, and time. Coordination: Coordination normal.   Psychiatric:         Behavior: Behavior normal. Behavior is cooperative. Thought Content: Thought content normal.         Judgment: Judgment normal.       ASSESSMENT     Diagnosis Orders   1. Primary hypertension  losartan (COZAAR) 50 MG tablet      2. Chronic pelvic pain in female  phenazopyridine (PYRIDIUM) 100 MG tablet             PLAN:  Orders Placed This Encounter   Medications    losartan (COZAAR) 50 MG tablet     Sig: Take 1 tablet by mouth daily     Dispense:  90 tablet     Refill:  1    phenazopyridine (PYRIDIUM) 100 MG tablet     Sig: Take 1 tablet by mouth 3 times daily as needed for Pain     Dispense:  9 tablet     Refill:  0         Start losartan 50 mg once daily for hypertension. Continue with urology for evaluation of chronic pelvic pain, plans to call to schedule for cystoscopy. Short course of Pyridium to evaluate if there is improvement in pain control. FOLLOW UP AND INSTRUCTIONS:  Return in about 4 weeks (around 10/12/2022).     Macario Jimenez received counseling on the following healthy behaviors:medication adherence    Discussed use, benefit, and side effects of prescribed medications. Barriers to  medication compliance addressed. All patient questions answered. Pt  verbalized understanding of all instructions given. Patient given educational materials - see patient instructions      Patient advised to contact scheduling offices for any referrals or imaging orders  placed today if they have not been contacted in 48 hours. Return in about 4 weeks (around 10/12/2022). An electronic signature was used to authenticate this note. --DAVIN Burns CNP on 9/14/2022 at 8:11 PM  Visit Information    Have you changed or started any medications since your last visit including any over-the-counter medicines, vitamins, or herbal medicines? no   Are you having any side effects from any of your medications? -  no  Have you stopped taking any of your medications? Is so, why? -  no    Have you seen any other physician or provider since your last visit? Yes - Records Obtained  Have you had any other diagnostic tests since your last visit? Yes - Records Obtained  Have you been seen in the emergency room and/or had an admission to a hospital since we last saw you? No  Have you had your routine dental cleaning in the past 6 months? no    Have you activated your [employer] account? If not, what are your barriers?  Yes     Patient Care Team:  DAVIN Burns CNP as PCP - General (Family Medicine)  DVAIN Burns CNP as PCP - [facility]    Medical History Review  Past Medical, Family, and Social History reviewed and does not contribute to the patient presenting condition    Health Maintenance   Topic Date Due    COVID-19 Vaccine (1) Never done    Pneumococcal 0-64 years Vaccine (1 - PCV) Never done    HIV screen  Never done    Hepatitis C screen  Never done    DTaP/Tdap/Td vaccine (1 - Tdap) Never done    Cervical cancer screen  Never done    Lipids  Never done Flu vaccine (1) Never done    Depression Screen  08/17/2023    Colorectal Cancer Screen  08/24/2025    Hepatitis A vaccine  Aged Out    Hepatitis B vaccine  Aged Out    Hib vaccine  Aged Out    Meningococcal (ACWY) vaccine  Aged Out

## 2022-09-19 ENCOUNTER — TELEPHONE (OUTPATIENT)
Dept: PRIMARY CARE CLINIC | Age: 47
End: 2022-09-19

## 2022-09-19 NOTE — TELEPHONE ENCOUNTER
Unfortunately I do not have any further recommendations, she will have to continue with the urologist as planned for the procedure

## 2022-09-29 ENCOUNTER — PATIENT MESSAGE (OUTPATIENT)
Dept: PRIMARY CARE CLINIC | Age: 47
End: 2022-09-29

## 2022-09-29 DIAGNOSIS — R10.31 BILATERAL LOWER ABDOMINAL PAIN: Primary | ICD-10-CM

## 2022-09-29 DIAGNOSIS — R10.32 BILATERAL LOWER ABDOMINAL PAIN: Primary | ICD-10-CM

## 2022-09-29 RX ORDER — DICYCLOMINE HYDROCHLORIDE 10 MG/1
10 CAPSULE ORAL 4 TIMES DAILY
Qty: 120 CAPSULE | Refills: 5 | Status: SHIPPED | OUTPATIENT
Start: 2022-09-29

## 2022-09-29 NOTE — TELEPHONE ENCOUNTER
From: Gudelia Willis  To: Braden Gabriel  Sent: 9/29/2022 2:14 PM EDT  Subject:  In pain    I have been talking Motrin Tylenol Advil and Aleve but sense I went to my appointment with Dr Claus Ibarra I have have had a pain level of 8 Tuesday night it was 10 can I get some pain meds

## 2022-10-12 ENCOUNTER — OFFICE VISIT (OUTPATIENT)
Dept: PRIMARY CARE CLINIC | Age: 47
End: 2022-10-12
Payer: COMMERCIAL

## 2022-10-12 VITALS
SYSTOLIC BLOOD PRESSURE: 170 MMHG | OXYGEN SATURATION: 96 % | HEART RATE: 96 BPM | WEIGHT: 126.4 LBS | DIASTOLIC BLOOD PRESSURE: 98 MMHG | BODY MASS INDEX: 23.88 KG/M2 | TEMPERATURE: 98.2 F

## 2022-10-12 DIAGNOSIS — I10 PRIMARY HYPERTENSION: Primary | ICD-10-CM

## 2022-10-12 DIAGNOSIS — E89.40 SURGICAL MENOPAUSE ON HORMONE REPLACEMENT THERAPY: ICD-10-CM

## 2022-10-12 DIAGNOSIS — R10.2 CHRONIC PELVIC PAIN IN FEMALE: ICD-10-CM

## 2022-10-12 DIAGNOSIS — Z79.890 SURGICAL MENOPAUSE ON HORMONE REPLACEMENT THERAPY: ICD-10-CM

## 2022-10-12 DIAGNOSIS — G89.29 CHRONIC PELVIC PAIN IN FEMALE: ICD-10-CM

## 2022-10-12 DIAGNOSIS — Z23 NEED FOR VACCINATION: ICD-10-CM

## 2022-10-12 PROCEDURE — G8484 FLU IMMUNIZE NO ADMIN: HCPCS | Performed by: NURSE PRACTITIONER

## 2022-10-12 PROCEDURE — 4004F PT TOBACCO SCREEN RCVD TLK: CPT | Performed by: NURSE PRACTITIONER

## 2022-10-12 PROCEDURE — G8420 CALC BMI NORM PARAMETERS: HCPCS | Performed by: NURSE PRACTITIONER

## 2022-10-12 PROCEDURE — G8427 DOCREV CUR MEDS BY ELIG CLIN: HCPCS | Performed by: NURSE PRACTITIONER

## 2022-10-12 PROCEDURE — 99214 OFFICE O/P EST MOD 30 MIN: CPT | Performed by: NURSE PRACTITIONER

## 2022-10-12 RX ORDER — LOSARTAN POTASSIUM 100 MG/1
100 TABLET ORAL DAILY
Qty: 30 TABLET | Refills: 2 | Status: SHIPPED | OUTPATIENT
Start: 2022-10-12 | End: 2023-01-10

## 2022-10-12 ASSESSMENT — ENCOUNTER SYMPTOMS
BLOOD IN STOOL: 0
SHORTNESS OF BREATH: 0
CHEST TIGHTNESS: 0
ABDOMINAL PAIN: 1
DIARRHEA: 0
VOMITING: 0
WHEEZING: 0
TROUBLE SWALLOWING: 0

## 2022-10-12 NOTE — Clinical Note
[address] [place] [address] Primary Care  [address] [place] [id number]  Phone: [phone]  Fax: [phone] [facility], APRN - CNP        October 12, 2022     Patient: Narda Buckley   YOB: 1975   Date of Visit: 10/12/2022       To Whom It May Concern: It is my medical opinion that Tessa Carla {Work release (duty restriction):82387}. If you have any questions or concerns, please don't hesitate to call.     Sincerely,        DAVIN Cash CNP

## 2022-10-12 NOTE — PROGRESS NOTES
[facility]  [address] [facility] [address]  Dept: [phone]  Dept Fax: [phone]    Patient Care Team:  DVAIN Castañeda CNP as PCP - General (Family Medicine)  DAVIN Castañeda CNP as PCP - [facility]    10/12/2022     Murali Bermeo (:  1975)is a 50 y.o. female, here for evaluation of the following medical concerns:   Chief Complaint   Patient presents with    Hypertension    Abdominal Pain       Murali Bermeo presents today to evaluate hypertension, states she is been compliant with daily losartan. States she had an episode roughly 2 weeks ago where she had dizzy spells for 3 to 4 days. Also had associated headache, no nausea or vomiting. Denies chest pains, no visual disturbances. Since last visit did see urology and have procedure to evaluate chronic lower abdominal pain. Also sees GI for colonoscopy in December. She reports that dicyclomine has improved her symptoms, states the pain feels similar to labor contractions and comes and pulsations or waves. .    Review of Systems   Constitutional:  Negative for appetite change, fever and unexpected weight change. HENT:  Negative for hearing loss and trouble swallowing. Eyes:  Negative for visual disturbance. Respiratory:  Negative for chest tightness, shortness of breath and wheezing. Cardiovascular:  Negative for chest pain and palpitations. Gastrointestinal:  Positive for abdominal pain. Negative for blood in stool, diarrhea and vomiting. Endocrine: Negative for polydipsia and polyuria. Genitourinary:  Positive for pelvic pain. Negative for dysuria, frequency, hematuria and menstrual problem. Musculoskeletal:  Negative for myalgias and neck pain. Neurological:  Positive for headaches. Negative for seizures, syncope, facial asymmetry and numbness.    Psychiatric/Behavioral:  Negative for suicidal ideas. The patient is not nervous/anxious. Prior to Visit Medications    Medication Sig Taking? Authorizing Provider   losartan (COZAAR) 100 MG tablet Take 1 tablet by mouth daily Yes DAVIN Carey CNP   dicyclomine (BENTYL) 10 MG capsule Take 1 capsule by mouth 4 times daily Yes DAVIN Carey CNP   estradiol (VIVELLE) 0.1 MG/24HR Place 1 patch onto the skin Twice a Week Yes Historical Provider, MD   ibuprofen (ADVIL;MOTRIN) 800 MG tablet take 1 tablet by mouth three times a day Yes Historical Provider, MD   acetaminophen (TYLENOL) 325 MG tablet Take 2 tablets by mouth every 6 hours as needed for Pain Yes Asim Ohm, DO        Social History     Tobacco Use    Smoking status: Every Day     Packs/day: 1.00     Years: 20.00     Pack years: 20.00     Types: Cigarettes    Smokeless tobacco: Never   Substance Use Topics    Alcohol use: Yes     Comment: 2-3 16 oz a day        Vitals:    10/12/22 1336 10/12/22 1406   BP: (!) 161/94 (!) 170/98   Pulse: 71 96   Temp: 98.2 °F (36.8 °C)    SpO2: 98% 96%   Weight: 126 lb 6.4 oz (57.3 kg)      Estimated body mass index is 23.88 kg/m² as calculated from the following:    Height as of 7/18/22: 5' 1\" (1.549 m). Weight as of this encounter: 126 lb 6.4 oz (57.3 kg). DIAGNOSTIC FINDINGS:  CBC:  Lab Results   Component Value Date/Time    WBC 7.4 06/13/2021 02:47 PM    HGB 14.6 06/13/2021 02:47 PM     06/13/2021 02:47 PM       BMP:    Lab Results   Component Value Date/Time     06/13/2021 02:47 PM    K 4.0 06/13/2021 02:47 PM     06/13/2021 02:47 PM    CO2 19 06/13/2021 02:47 PM    BUN 13 06/13/2021 02:47 PM    CREATININE 0.72 06/13/2021 02:47 PM    GLUCOSE 92 06/13/2021 02:47 PM       HEMOGLOBIN A1C: No results found for: LABA1C    FASTING LIPID PANEL:No results found for: CHOL, HDL, TRIG    Physical Exam  Vitals and nursing note reviewed. Constitutional:       General: She is not in acute distress.      Appearance: She is well-developed. She is not ill-appearing. HENT:      Head: Normocephalic. Eyes:      General: No scleral icterus. Pupils: Pupils are equal, round, and reactive to light. Cardiovascular:      Rate and Rhythm: Normal rate and regular rhythm. Pulmonary:      Effort: Pulmonary effort is normal.      Breath sounds: Normal breath sounds. Abdominal:      Palpations: Abdomen is soft. Tenderness: There is no abdominal tenderness. There is no right CVA tenderness, left CVA tenderness or guarding. Musculoskeletal:      Cervical back: Normal range of motion and neck supple. Skin:     General: Skin is warm and dry. Neurological:      Mental Status: She is alert and oriented to person, place, and time. Coordination: Coordination normal.   Psychiatric:         Behavior: Behavior normal. Behavior is cooperative. Thought Content: Thought content normal.         Judgment: Judgment normal.       ASSESSMENT     Diagnosis Orders   1. Primary hypertension  losartan (COZAAR) 100 MG tablet    CBC with Auto Differential    TSH With Reflex Ft4      2. Need for vaccination        3. Chronic pelvic pain in female  [facility]      4. Surgical menopause on hormone replacement therapy  [facility]             PLAN:  Orders Placed This Encounter   Medications    losartan (COZAAR) 100 MG tablet     Sig: Take 1 tablet by mouth daily     Dispense:  30 tablet     Refill:  2         At this time will increase losartan 100 mg daily. Did order TSH to evaluate hypertension and dizziness, along with repeat CBC  Patient with history 4 years ago total hysterectomy and bilateral oophorectomy. Currently on hormone replacement patches, referral to gynecology provided today. Encourage patient to continue with follow-up with GI as pain has responded to antispasmodics.     FOLLOW UP AND INSTRUCTIONS:  Return in about 6 weeks (around 11/23/2022). Gilford Melena received counseling on the following healthy behaviors:nutrition, medication adherence, and tobacco cessation    Discussed use, benefit, and side effects of prescribed medications. Barriers to  medication compliance addressed. All patient questions answered. Pt  verbalized understanding of all instructions given. Patient given educational materials - see patient instructions      Patient advised to contact scheduling offices for any referrals or imaging orders  placed today if they have not been contacted in 48 hours. Return in about 6 weeks (around 11/23/2022). An electronic signature was used to authenticate this note. --DAVIN Zuniga CNP on 10/12/2022 at 2:35 PM   Chronic Disease Visit Information    BP Readings from Last 3 Encounters:   10/12/22 (!) 170/98   09/14/22 (!) 169/105   08/17/22 (!) 149/100          BUN (mg/dL)   Date Value   06/13/2021 13     Creatinine (mg/dL)   Date Value   06/13/2021 0.72     Glucose (mg/dL)   Date Value   06/13/2021 92            Have you changed or started any medications since your last visit including any over-the-counter medicines, vitamins, or herbal medicines? no   Are you having any side effects from any of your medications? -  no  Have you stopped taking any of your medications? Is so, why? -  no    Have you seen any other physician or provider since your last visit? No  Have you had any other diagnostic tests since your last visit? No  Have you been seen in the emergency room and/or had an admission to a hospital since we last saw you? Yes - Records Obtained  Have you had your annual diabetic retinal (eye) exam? No  Have you had your routine dental cleaning in the past 6 months? no    Have you activated your [employer] account? If not, what are your barriers?  No:      Patient Care Team:  DAVIN Zuniga CNP as PCP - General (Family Medicine)  DAVIN Zuniga CNP as PCP - [facility] Medical History Review  Past Medical, Family, and Social History reviewed and does not contribute to the patient presenting condition    Health Maintenance   Topic Date Due    COVID-19 Vaccine (1) Never done    HIV screen  Never done    Hepatitis C screen  Never done    DTaP/Tdap/Td vaccine (1 - Tdap) Never done    Cervical cancer screen  Never done    Lipids  Never done    Pneumococcal 0-64 years Vaccine (1 - PCV) 01/02/2023 (Originally 3/21/1979)    Flu vaccine (1) 10/12/2023 (Originally 8/1/2022)    Depression Screen  08/17/2023    Colorectal Cancer Screen  08/24/2025    Hepatitis A vaccine  Aged Out    Hib vaccine  Aged Out    Meningococcal (ACWY) vaccine  Aged Out

## 2022-10-13 ENCOUNTER — TELEPHONE (OUTPATIENT)
Dept: PRIMARY CARE CLINIC | Age: 47
End: 2022-10-13

## 2022-10-13 NOTE — TELEPHONE ENCOUNTER
Pt called in requesting letter stating she has been unable to work form 7/2022-12/2022 when she is scheduled to see specialist. Pt states this was discussed at appt on 10/12/22. . Per pt request would like faxed to  at [address] [place] [address] By N [phone].

## 2022-10-13 NOTE — LETTER
[address] [place] [address] Primary Care  [address] [address]  Phone: [phone]  Fax: [phone] [facility], DAVIN - JOHN    Lary Mooney  1975        October 19, 2022      To whom it may concern,    Lary Mooney is a patient under my care, established in August 2022. She has been experiencing chronic pain and undergoing evaluation by multiple specialties. She reports symptoms started in July, rendering her unable to work. At this time she is still seeking a diagnosis and treatment for her condition, but the pain renders her unable to work.   At this time I advise she remain out of work until 12-22 when she completes her evaluation with a GI specialist        Sincerely,        DAVIN Donnelly CNP

## 2022-11-01 ENCOUNTER — HOSPITAL ENCOUNTER (EMERGENCY)
Age: 47
Discharge: HOME | End: 2022-11-01
Attending: EMERGENCY MEDICINE
Payer: COMMERCIAL

## 2022-11-01 VITALS
BODY MASS INDEX: 23.6 KG/M2 | DIASTOLIC BLOOD PRESSURE: 106 MMHG | SYSTOLIC BLOOD PRESSURE: 172 MMHG | HEIGHT: 61 IN | WEIGHT: 125 LBS | HEART RATE: 105 BPM | RESPIRATION RATE: 18 BRPM | TEMPERATURE: 98.8 F | OXYGEN SATURATION: 100 %

## 2022-11-01 DIAGNOSIS — L03.311 CELLULITIS OF ABDOMINAL WALL: Primary | ICD-10-CM

## 2022-11-01 PROCEDURE — 6370000000 HC RX 637 (ALT 250 FOR IP): Performed by: STUDENT IN AN ORGANIZED HEALTH CARE EDUCATION/TRAINING PROGRAM

## 2022-11-01 PROCEDURE — 99283 EMERGENCY DEPT VISIT LOW MDM: CPT

## 2022-11-01 RX ORDER — ACETAMINOPHEN 500 MG
500 TABLET ORAL 4 TIMES DAILY PRN
Qty: 30 TABLET | Refills: 1 | Status: SHIPPED | OUTPATIENT
Start: 2022-11-01 | End: 2022-12-01

## 2022-11-01 RX ORDER — CLINDAMYCIN HYDROCHLORIDE 150 MG/1
450 CAPSULE ORAL ONCE
Status: COMPLETED | OUTPATIENT
Start: 2022-11-01 | End: 2022-11-01

## 2022-11-01 RX ORDER — CLINDAMYCIN HYDROCHLORIDE 150 MG/1
450 CAPSULE ORAL 3 TIMES DAILY
Qty: 90 CAPSULE | Refills: 0 | Status: SHIPPED | OUTPATIENT
Start: 2022-11-01 | End: 2022-11-11

## 2022-11-01 RX ORDER — IBUPROFEN 800 MG/1
800 TABLET ORAL ONCE
Status: COMPLETED | OUTPATIENT
Start: 2022-11-01 | End: 2022-11-01

## 2022-11-01 RX ADMIN — IBUPROFEN 800 MG: 800 TABLET, FILM COATED ORAL at 13:55

## 2022-11-01 RX ADMIN — CLINDAMYCIN HYDROCHLORIDE 450 MG: 150 CAPSULE ORAL at 13:55

## 2022-11-01 ASSESSMENT — ENCOUNTER SYMPTOMS
ABDOMINAL PAIN: 1
SHORTNESS OF BREATH: 0
FACIAL SWELLING: 0
BACK PAIN: 0
VOMITING: 0

## 2022-11-01 ASSESSMENT — PAIN SCALES - GENERAL: PAINLEVEL_OUTOF10: 8

## 2022-11-01 ASSESSMENT — PAIN DESCRIPTION - LOCATION: LOCATION: ABDOMEN

## 2022-11-01 ASSESSMENT — PAIN DESCRIPTION - DESCRIPTORS: DESCRIPTORS: BURNING;DISCOMFORT

## 2022-11-01 NOTE — ED NOTES
Pt states she felt a pain on her abdomen x 2 nights ago and believed it to be an insect bite. The next day, her s/o tried to pop what she describes as a pimple with no success. Today, the pt arrives with a swollen area above her umbilicus roughly 9\"R0\". No other complaints at this time. Breathing is non-labored. Call light is within reach.       Kike Negrete RN  11/01/22 0917

## 2022-11-01 NOTE — ED PROVIDER NOTES
[facility]     Emergency Department     Faculty Note/ Attestation      Pt Name: Mary Cade                                       MRN: [id number]  [place] 1975  Date of evaluation: 11/1/2022    Patients PCP:    DAVIN Diez - CNP    Attestation  I performed a history and physical examination of the patient and discussed management with the resident. I reviewed the residents note and agree with the documented findings and plan of care. Any areas of disagreement are noted on the chart. I was personally present for the key portions of any procedures. I have documented in the chart those procedures where I was not present during the key portions. I have reviewed the emergency nurses triage note. I agree with the chief complaint, past medical history, past surgical history, allergies, medications, social and family history as documented unless otherwise noted below. For Physician Assistant/ Nurse Practitioner cases/documentation I have personally evaluated this patient and have completed at least one if not all key elements of the E/M (history, physical exam, and MDM). Additional findings are as noted.     Initial Screens:        Nathaly Coma Scale  Eye Opening: Spontaneous  Best Verbal Response: Oriented  Best Motor Response: Obeys commands  Nathaly Coma Scale Score: 15    Vitals:    Vitals:    11/01/22 1234   BP: (!) 172/106   Pulse: (!) 105   Resp: 18   Temp: 98.8 °F (37.1 °C)   TempSrc: Oral   SpO2: 100%   Weight: 125 lb (56.7 kg)   Height: 5' 1\" (1.549 m)       CHIEF COMPLAINT       Chief Complaint   Patient presents with   Nick Bernal 81     Is a 68-year-old female was bitten by bug 2 nights ago increasing pain redness swelling of the area and no fevers chills no changes in appetite    EMERGENCY DEPARTMENT COURSE:     -------------------------  BP: (!) 172/106, Temp: 98.8 °F (37.1 °C), Heart Rate: (!) 105, Resp: 18  Physical Exam  Constitutional:       Appearance: She is well-developed. She is not diaphoretic. HENT:      Head: Normocephalic and atraumatic. Right Ear: External ear normal.      Left Ear: External ear normal.   Eyes:      General: No scleral icterus. Right eye: No discharge. Left eye: No discharge. Neck:      Trachea: No tracheal deviation. Pulmonary:      Effort: Pulmonary effort is normal. No respiratory distress. Breath sounds: No stridor. Abdominal:      General: There is no distension. Tenderness: There is no abdominal tenderness. Comments: Superficial skin cellulitis present with likely source of irritation and infection. Musculoskeletal:         General: Normal range of motion. Cervical back: Normal range of motion. Skin:     General: Skin is warm and dry. Neurological:      Mental Status: She is alert and oriented to person, place, and time. Coordination: Coordination normal.   Psychiatric:         Behavior: Behavior normal.         Comments  Clear a superficial cellulitis however abscess cannot be excluded will obtain bedside ultrasound to further elucidate this will give antibiotics prior to discharge    ED Course as of 11/01/22 1542   Tue Nov 01, 2022   1343 Patient presenting with an insect bite that is been ongoing for the past 2 days. She has not remember seeing the insect as she was asleep. She said this of burning pains been progressively worsening. We will get ultrasound to assess for abscess. Patient given Motrin for pain, does have a anaphylactic allergy to amoxicillin. No history of taking cephalosporins. Will start on clindamycin instead. [ML]   0703 Significant other had tried to pop the insect bite yesterday and it made the site more swollen. [ML]   1350 Per chart review, cephalosporins have been avoided due to patient's anaphylactic allergy at other hospitals. [ML]   9262 Bedside ultrasound demonstrating cellulitis only, no abscess identified.  [ML]      ED Course User Index  [ML] DO Curly Veliz DO,, DO, RDMS.   Attending Emergency Physician         Curly Maciel DO  11/01/22 1085

## 2022-11-01 NOTE — DISCHARGE INSTRUCTIONS
Thank you for coming to [facility] emergency department. You were seen for a skin infection (also called cellulitis) and started on an antibiotic called clindamycin. We are starting this antibiotic as you have a serious allergy to amoxicillin. Please follow up with [facility] this week to make sure that you are skin infection is improving. For pain take Tylenol and Motrin. Drink plenty of water while you are taking this antibiotic. Please return to the emergency department if you start having any diarrhea, worsening of symptoms, fevers that are not controlled by Tylenol or Motrin.

## 2022-11-01 NOTE — ED PROVIDER NOTES
[facility] ED  Emergency Department Encounter  Emergency Medicine Resident     Pt Name:Lu Santos  MRN: [id number]  [place] 1975  Date of evaluation: 22  PCP:  DAVIN Marmolejo CNP      CHIEF COMPLAINT       Chief Complaint   Patient presents with    Insect Bite       HISTORY OF PRESENT ILLNESS  (Location/Symptom, Timing/Onset, Context/Setting, Quality, Duration, Modifying Factors, Severity.)      Noah Pennington is a 50 y.o. female who presents with concerns for an insect bite on her abdomen that occurred 2 days ago. Patient reports this happened while she was sleeping and did not see the insect. Since then she has hadSwelling over the skin her abdomen and burning-like pain. This has been progressively worsening. She did try to pop it, however this made it worse. Currently rating her pain is 8 out of 10. Did not take any medications prior to arrival.    PAST MEDICAL / SURGICAL / SOCIAL / FAMILY HISTORY      has a past medical history of Current smoker and Cysts of both ovaries. has a past surgical history that includes Hysterectomy and  section.       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    Sexual activity: Not on file   Other Topics Concern    Not on file   Social History Narrative    Not on file     Social Determinants of Health     Financial Resource Strain: Low Risk     Difficulty of Paying Living Expenses: Not hard at all   Food Insecurity: No Food Insecurity    Worried About Running Out of Food in the Last Year: Never true    Ran Out of Food in the Last Year: Never true   Transportation Needs: No Transportation Needs    Lack of Transportation (Medical): No    Lack of Transportation (Non-Medical): No   Physical Activity: Not on file   Stress: Not on file   Social Connections: Not on file   Intimate Partner Violence: Not on file   Housing Stability: Not on file       Family History   Problem Relation Age of Onset    Heart Disease Mother     Heart Disease Brother 48    Cancer Neg Hx        Allergies:  Ampicillin    Home Medications:  Prior to Admission medications    Medication Sig Start Date End Date Taking? Authorizing Provider   clindamycin (CLEOCIN) 150 MG capsule Take 3 capsules by mouth 3 times daily for 10 days 11/1/22 11/11/22 Yes Lois Beatty DO   acetaminophen (TYLENOL) 500 MG tablet Take 1 tablet by mouth 4 times daily as needed for Pain 11/1/22 12/1/22 Yes Lois Beatty DO   losartan (COZAAR) 100 MG tablet Take 1 tablet by mouth daily 10/12/22 1/10/23  DAVIN Quinn - CNP   dicyclomine (BENTYL) 10 MG capsule Take 1 capsule by mouth 4 times daily 9/29/22   Akbar Emmanuel APRN - CNP   estradiol (VIVELLE) 0.1 MG/24HR Place 1 patch onto the skin Twice a Week    Historical Provider, MD   ibuprofen (ADVIL;MOTRIN) 800 MG tablet take 1 tablet by mouth three times a day 7/24/22   Historical Provider, MD   acetaminophen (TYLENOL) 325 MG tablet Take 2 tablets by mouth every 6 hours as needed for Pain 6/13/21   Maryan Carr DO       REVIEW OF SYSTEMS    (2-9 systems for level 4, 10 or more for level 5)      Review of Systems   Constitutional:  Negative for appetite change. HENT:  Negative for facial swelling. Respiratory:  Negative for shortness of breath. Cardiovascular:  Negative for chest pain. Gastrointestinal:  Positive for abdominal pain. Negative for vomiting. Musculoskeletal:  Negative for back pain.    Skin:  Positive for wound. Allergic/Immunologic:        Anaphylaxis to ampicillin   Neurological:  Negative for headaches. Hematological:         - AC   Psychiatric/Behavioral:  Negative for confusion. PHYSICAL EXAM   (up to 7 for level 4, 8 or more for level 5)      INITIAL VITALS:   BP (!) 172/106   Pulse (!) 105   Temp 98.8 °F (37.1 °C) (Oral)   Resp 18   Ht 5' 1\" (1.549 m)   Wt 125 lb (56.7 kg)   SpO2 100%   BMI 23.62 kg/m²     Physical Exam  Constitutional:       General: She is not in acute distress. HENT:      Head: Normocephalic and atraumatic. Right Ear: External ear normal.      Left Ear: External ear normal.      Nose: Nose normal.   Eyes:      Conjunctiva/sclera: Conjunctivae normal.   Cardiovascular:      Rate and Rhythm: Normal rate. Pulses: Normal pulses. Pulmonary:      Effort: Pulmonary effort is normal. No respiratory distress. Abdominal:      General: Abdomen is flat. There is no distension. Palpations: Abdomen is soft. Tenderness: There is no guarding or rebound. Comments: There is an area of induration above the umbilicus, there is no area of fluctuance. There is a small abrasion at the center. No purulent discharge   Musculoskeletal:         General: No swelling. Cervical back: No tenderness. Skin:     General: Skin is warm. Capillary Refill: Capillary refill takes less than 2 seconds. Neurological:      Mental Status: She is alert and oriented to person, place, and time. Psychiatric:         Mood and Affect: Mood normal.       DIFFERENTIAL  DIAGNOSIS     PLAN (LABS / IMAGING / EKG):  No orders of the defined types were placed in this encounter.       MEDICATIONS ORDERED:  Orders Placed This Encounter   Medications    ibuprofen (ADVIL;MOTRIN) tablet 800 mg    clindamycin (CLEOCIN) capsule 450 mg     Order Specific Question:   Antimicrobial Indications     Answer:   Skin and Soft Tissue Infection    clindamycin (CLEOCIN) 150 MG capsule     Sig: Take 3 capsules by mouth 3 times daily for 10 days     Dispense:  90 capsule     Refill:  0    acetaminophen (TYLENOL) 500 MG tablet     Sig: Take 1 tablet by mouth 4 times daily as needed for Pain     Dispense:  30 tablet     Refill:  1       DDX: Cellulitis, abscess    DIAGNOSTIC RESULTS / EMERGENCY DEPARTMENT COURSE / MDM       EMERGENCY DEPARTMENT COURSE:    ED Course as of 11/01/22 1549   Tue Nov 01, 2022   1343 Patient presenting with an insect bite that is been ongoing for the past 2 days. She has not remember seeing the insect as she was asleep. She said this of burning pains been progressively worsening. We will get ultrasound to assess for abscess. Patient given Motrin for pain, does have a anaphylactic allergy to amoxicillin. No history of taking cephalosporins. Will start on clindamycin instead. [ML]   7951 Significant other had tried to pop the insect bite yesterday and it made the site more swollen. [ML]   1350 Per chart review, cephalosporins have been avoided due to patient's anaphylactic allergy at other hospitals. [ML]   5868 Bedside ultrasound demonstrating cellulitis only, no abscess identified. [ML]      ED Course User Index  [ML] Bernard Niño DO     Prescription sent to pharmacy. FINAL IMPRESSION      1.  Cellulitis of abdominal wall          DISPOSITION / PLAN     DISPOSITION Decision To Discharge 11/01/2022 01:52:25 PM      PATIENT REFERRED TO:  DAVIN Gay CNP  [address]  [phone]    Schedule an appointment as soon as possible for a visit in 2 days  If symptoms worsen      DISCHARGE MEDICATIONS:  Discharge Medication List as of 11/1/2022  1:56 PM        START taking these medications    Details   clindamycin (CLEOCIN) 150 MG capsule Take 3 capsules by mouth 3 times daily for 10 days, Disp-90 capsule, R-0Normal      !! acetaminophen (TYLENOL) 500 MG tablet Take 1 tablet by mouth 4 times daily as needed for Pain, Disp-30 tablet, R-1Normal       !! - Potential duplicate medications found. Please discuss with provider.           Valdez Matt DO  Emergency Medicine Resident    (Please note that portions of thisnote were completed with a voice recognition program.  Efforts were made to edit the dictations but occasionally words are mis-transcribed.)       Kd Hensley DO  Resident  11/01/22 9446

## 2022-11-22 ENCOUNTER — OFFICE VISIT (OUTPATIENT)
Dept: OBGYN | Age: 47
End: 2022-11-22

## 2022-11-22 VITALS
WEIGHT: 129 LBS | BODY MASS INDEX: 24.37 KG/M2 | HEART RATE: 97 BPM | DIASTOLIC BLOOD PRESSURE: 88 MMHG | SYSTOLIC BLOOD PRESSURE: 135 MMHG

## 2022-11-22 DIAGNOSIS — Z12.31 SCREENING MAMMOGRAM FOR HIGH-RISK PATIENT: ICD-10-CM

## 2022-11-22 DIAGNOSIS — Z87.42 HISTORY OF PID: ICD-10-CM

## 2022-11-22 DIAGNOSIS — I10 HYPERTENSION, UNSPECIFIED TYPE: ICD-10-CM

## 2022-11-22 DIAGNOSIS — Z90.79 HISTORY OF TOTAL ABDOMINAL HYSTERECTOMY AND BILATERAL SALPINGO-OOPHORECTOMY: ICD-10-CM

## 2022-11-22 DIAGNOSIS — Z90.710 HISTORY OF TOTAL ABDOMINAL HYSTERECTOMY AND BILATERAL SALPINGO-OOPHORECTOMY: ICD-10-CM

## 2022-11-22 DIAGNOSIS — R10.31 RIGHT LOWER QUADRANT PAIN: Primary | ICD-10-CM

## 2022-11-22 DIAGNOSIS — Z90.49 HISTORY OF APPENDECTOMY: ICD-10-CM

## 2022-11-22 DIAGNOSIS — Z90.722 HISTORY OF TOTAL ABDOMINAL HYSTERECTOMY AND BILATERAL SALPINGO-OOPHORECTOMY: ICD-10-CM

## 2022-11-22 DIAGNOSIS — R10.2 PELVIC PAIN: ICD-10-CM

## 2022-11-22 NOTE — PROGRESS NOTES
Winifred Rodgers  11/22/2022              50 y.o. Chief Complaint   Patient presents with    New Patient     Referred from Dr Ever Downing for pelvic pain for 5 months now         No LMP recorded. Patient has had a hysterectomy. Primary Care Physician: DAVIN Duffy - CNP    HPI : Winifred Rodgers is a 50 y.o. female [id number]    Patient is here today to establish care with gynecology for a well woman exam. She presents with 5 months of right lower quadrant pain. Patient was referred by her APRN for further assessment of her symptoms. The patient reports her symptoms are intermittent throughout the day, sharp and cramping. They are worsened after she urinates or has a bowel movement, as well as with increases in intraabdominal pressure. She denies nausea or vomiting or early satiety. She denies hematuria, frequency or hesitancy. She denies melena or hematochezia. She denies recent weight changes. Patient is s/p YANICK-BSO with appendectomy in 2019 at [facility] due to a long history of endometriosis and PID with TOAs requiring IR guided drain placements. She reports she was started on estrogen therapy at the time of her hysterectomy and has been intermittently using the estrogen since then. She denies pain or other abdominal issues over the past 3 years post-operatively until now. She was initially seen in the ED on 7/22/22 at [facility] for abdominal pain. CT scan showed a dilated pancreatic duct and she was referred to GI for further work up. MCRP completed 8/29/22 showed no pancreatic duct dilation and no other abnormalities. Cologard results were negative. She was then referred to Urology for suspected UTI due to bladder wall thickening noted on imaging. Cystoscopy performed 10/3/22 at [facility] was unremarkable. Patient is also scheduled for a colonoscopy on 12/15/22 with Dr. Lea Correa.      The patient reports some discomfort during intercourse with deep penetration at the level of the vaginal cuff and out laterally. She denies pain with external stimulation or insertion. She denies vaginal dryness or issues with arousal.    ________________________________________________________________________  OB History    Para Term  AB Living   7 5 5   2 5   SAB IAB Ectopic Molar Multiple Live Births     2       5      # Outcome Date GA Lbr Castillo/2nd Weight Sex Delivery Anes PTL Lv   7 IAB      TAB      6 IAB      TAB      5 Term      CS-LTranv   FARIBA      Birth Comments:  With tubal ligation   4 Term         FARIBA   3 Term         FARIBA   2 Term         FARIBA   1 Term      CS-LTranv   FARIBA     Past Medical History:   Diagnosis Date    Current smoker     Cysts of both ovaries                                                                    Past Surgical History:   Procedure Laterality Date     SECTION      YANICK AND BSO (CERVIX REMOVED)      YANICK-BSO, appendectomy     Family History   Problem Relation Age of Onset    Heart Disease Mother     Heart Disease Brother 48    Cancer Neg Hx      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    Sexual activity: Yes     Partners: Male     Birth control/protection: Other-see comments     Comment: tubal   Other Topics Concern    Not on file   Social History Narrative    Not on file     Social Determinants of Health     Financial Resource Strain: Low Risk     Difficulty of Paying Living Expenses: Not hard at all   Food Insecurity: No Food Insecurity Worried About [address] in the Last Year: Never true    [address] in the Last Year: Never true   Transportation Needs: No Transportation Needs    Lack of Transportation (Medical): No    Lack of Transportation (Non-Medical): No   Physical Activity: Not on file   Stress: Not on file   Social Connections: Not on file   Intimate Partner Violence: Not on file   Housing Stability: Not on file       MEDICATIONS:  Current Outpatient Medications   Medication Sig Dispense Refill    acetaminophen (TYLENOL) 500 MG tablet Take 1 tablet by mouth 4 times daily as needed for Pain 30 tablet 1    losartan (COZAAR) 100 MG tablet Take 1 tablet by mouth daily 30 tablet 2    dicyclomine (BENTYL) 10 MG capsule Take 1 capsule by mouth 4 times daily 120 capsule 5    estradiol (VIVELLE) 0.1 MG/24HR Place 1 patch onto the skin Twice a Week      ibuprofen (ADVIL;MOTRIN) 800 MG tablet take 1 tablet by mouth three times a day      acetaminophen (TYLENOL) 325 MG tablet Take 2 tablets by mouth every 6 hours as needed for Pain 20 tablet 0     No current facility-administered medications for this visit. ALLERGIES:  Allergies as of 11/22/2022 - Fully Reviewed 11/22/2022   Allergen Reaction Noted    Ampicillin  03/08/2021         Denies anhedonia or depressed mood    Immunization status: stated as current, but no records available. Gynecologic History:  Menarche: 13 yo  Menopause: Surgical menopause at 52 yo     No LMP recorded. Patient has had a hysterectomy. Sexually Active: Yes  STD History: Yes, multiple STI's with PID/TOAs     Permanent Sterilization: Yes s/p hysterectomy    Reversible Birth Control: No      Hormone Replacement Exposure: Not currently     Genetic Qualified Family History of Breast, Ovarian , Colon or Uterine Cancer: No   If YES see scanned worksheet.     Preventative Health Testing:  Health Maintenance Due:  Health Maintenance Due   Topic Date Due    COVID-19 Vaccine (1) Never done    HIV screen  Never done    Hepatitis C screen  Never done    DTaP/Tdap/Td vaccine (1 - Tdap) Never done    Lipids  Never done       Date of Last Pap Smear: unknown   Abnormal Pap Smear History: reports abnormal pap smears, denies history of NKECHI 2 or 3, or history of LEEP/CKC  Colposcopy History: unknown  Date of Last Mammogram: Patient has never had a mammogram  Date of Last Colonoscopy: Patient has never had a colonoscopy  Date of Last Bone Density: Patient has never had a DEXA  ________________________________________________________________________  REVIEW OF SYSTEMS:   Constitutional: negative fever, negative chills, negative weight changes   HEENT: negative visual disturbances, negative headaches, negative dizziness   Breast: negative breast abnormalities, negative breast lumps, negative nipple discharge  Respiratory: negative dyspnea, negative cough, negative SOB  Cardiovascular: negative chest pain, negative palpitations, negative syncope   Gastrointestinal: positive abdominal pain, negative RUQ pain, negative N/V, negative diarrhea, negative constipation, negative bowel changes, negative heartburn   Genitourinary: negative dysuria, negative hematuria, negative urinary incontinence, negative vaginal discharge  Dermatological: negative rash, negative pruritis, negative mole changes  Hematologic: negative bruising  Immunologic/Lymphatic: negative recent illness, negative recent sick contact  Musculoskeletal: negative back pain, negative myalgias, negative arthralgias  Neurological:  negative dizziness, negative migraines, negative seizures, negative weakness  Behavior/Psych: negative depression, negative anxiety, negative SI, negative HI                                                                                                                                                                                 PHYSICAL Exam:     Constitutional:  Vitals:    11/22/22 1506   BP: 135/88   Pulse: 97   Weight: 129 lb (58.5 kg) General appearance:  no apparent distress, alert and cooperative, BMI reviewed   HEENT: normocephalic, atraumatic, neck supple, no JVD, thyroid not enlarged with no palpable masses  Lymphatic: no lymph nodes were palpable in neck, axilla or groin   Neurologic:  normal speech, no focal findings or movement disorder noted  Lungs:  no increased work of breathing, good air exchange,no conversational dyspnea  Heart:  regular rate, distal pulses intact, noncyanotic extremities   Abdomen:  soft, non-tender, no guarding, rebound or rigidity on palpation, bowel sounds appreciated in all quadrants, no CVA tenderness bilaterally;  section scar and midline vertical scar noted   Musculoskeletal: normal gait noted, no gross abnormalities appreciated,  Extremities:  no calf tenderness bilaterally, non-edematous bilaterally  Skin: normal inspection of skin without rashes or lesions  Pelvic Exam:   Vulva: normal appearing vulva, no masses, tenderness or lesions, normal clitoris.  No pain with palpation of the pelvic floor    Vagina: normal appearing urethral meatus, normal appearing vaginal mucosa with normal color and physiologic discharge, no lesions, no vaginal bleeding   Cervix: surgically absent   Uterus: surgically absent   Adnexa: surgically absent ovaries; minimal tenderness with palpation of the adnexal region   Rectal Exam: not indicated   Breast: no tenderness on palpation, no masses appreciated, no nipple retraction, dimpling, discharge or bleeding, no axillary or supraclavicular adenopathy, self breast exams were reviewed in detail   Psych:  normal orientation to time, place and person, good historian, no mood or affect changes, pleasant        ASSESSMENT/PLAN:  Demetri Ware is a 50 y.o. female  here for her annual exam      Well Women Exam   - VSS   - Vaginitis and GC/C declined   - Pap smear not indicated, s/p YANICK-BSO (2019)   - STD counseling and prevention reviewed    - Patient declined blood work for T.Pal, Hepatitis and HIV testing   - Mammogram ordered   - Mammograms every year if the patient is 36years old and her last mammogram was negative   - Calcium and Vitamin D dosing reviewed    - Colonoscopy screening reviewed as well as onset for bone density testing   - Routine health maintenance per patient's PCP   - Repeat annual exam every year     RLQ abdominal pain    - Discussed possibility of pain from adhesive disease vs musculoskeletal in origin    - No evidence of gynecologic involvement, patient is s/p YANICK-BSO   - Referrals made to general surgery and pelvic floor physical therapy for further input    Return if symptoms worsen or fail to improve. Counseling Completed:  Hereditary breast, ovarian, colon and uterine cancer screening done  Tobacco and secondary smoke risks reviewed.  Instructed on cessation and avoidance     Orders Placed This Encounter   Procedures    JAISON DIGITAL SCREEN W OR WO CAD BILATERAL     May perform additional testing-diagnostic imaging, ultrasound and biopsy to the radiologist discretion  And/or referral to [address] [place] [address]  ([id number])     Standing Status:   Future     Standing Expiration Date:   11/22/2023     Order Specific Question:   Reason for exam:     Answer:   breast screening    [address] [place], General Surgery, [facility]     Referral Priority:   Routine     Referral Type:   Eval and Treat     Referral Reason:   Specialty Services Required     Referred to Provider:   Long Mata DO     Requested Specialty:   General Surgery     Number of Visits Requested:   [address] [place] [address]     Referral Priority:   Routine     Referral Type:   Eval and Treat     Referral Reason:   Specialty Services Required     Requested Specialty:   Physical Therapist     Number of Visits Requested:   1        Patient Active Problem List    Diagnosis Date Noted    Right lower quadrant pain 11/22/2022     Priority: Medium    Hx of YANICK-BSO (2019) 11/22/2022     Priority: Medium     Saline's Geov[place] (op report in Care Everywhere)      Hypertension 11/22/2022     Priority: Medium    Hx of appendectomy (2019) 11/22/2022     Priority: Medium    Hx of PID 11/22/2022     Priority: Paul Santana 14, DO  Ob/Gyn Resident  OTILIO DIANE [facility]  11/23/2022, 4:29 PM        Attending Physician Statement  I have discussed the care of Lary Mooney, including pertinent history and exam findings,  with the resident. I have reviewed the key elements of all parts of the encounter with the resident. I agree with the assessment, plan and orders as documented by the resident.   (GE Modifier)    Rafy Show, DO

## 2022-11-29 ENCOUNTER — OFFICE VISIT (OUTPATIENT)
Dept: PRIMARY CARE CLINIC | Age: 47
End: 2022-11-29
Payer: COMMERCIAL

## 2022-11-29 VITALS
SYSTOLIC BLOOD PRESSURE: 138 MMHG | OXYGEN SATURATION: 98 % | HEART RATE: 96 BPM | WEIGHT: 129 LBS | DIASTOLIC BLOOD PRESSURE: 100 MMHG | TEMPERATURE: 97.5 F | BODY MASS INDEX: 24.37 KG/M2

## 2022-11-29 DIAGNOSIS — M54.12 CERVICAL RADICULOPATHY: ICD-10-CM

## 2022-11-29 DIAGNOSIS — I10 PRIMARY HYPERTENSION: Primary | ICD-10-CM

## 2022-11-29 DIAGNOSIS — Z87.891 PERSONAL HISTORY OF TOBACCO USE, PRESENTING HAZARDS TO HEALTH: ICD-10-CM

## 2022-11-29 PROCEDURE — G8427 DOCREV CUR MEDS BY ELIG CLIN: HCPCS | Performed by: NURSE PRACTITIONER

## 2022-11-29 PROCEDURE — 99214 OFFICE O/P EST MOD 30 MIN: CPT | Performed by: NURSE PRACTITIONER

## 2022-11-29 PROCEDURE — G8420 CALC BMI NORM PARAMETERS: HCPCS | Performed by: NURSE PRACTITIONER

## 2022-11-29 PROCEDURE — 3079F DIAST BP 80-89 MM HG: CPT | Performed by: NURSE PRACTITIONER

## 2022-11-29 PROCEDURE — G8484 FLU IMMUNIZE NO ADMIN: HCPCS | Performed by: NURSE PRACTITIONER

## 2022-11-29 PROCEDURE — 3075F SYST BP GE 130 - 139MM HG: CPT | Performed by: NURSE PRACTITIONER

## 2022-11-29 PROCEDURE — 4004F PT TOBACCO SCREEN RCVD TLK: CPT | Performed by: NURSE PRACTITIONER

## 2022-11-29 RX ORDER — GABAPENTIN 300 MG/1
300 CAPSULE ORAL 3 TIMES DAILY
Qty: 90 CAPSULE | Refills: 1 | Status: SHIPPED | OUTPATIENT
Start: 2022-11-29 | End: 2022-12-29

## 2022-11-29 ASSESSMENT — ENCOUNTER SYMPTOMS
WHEEZING: 0
CHEST TIGHTNESS: 0
SHORTNESS OF BREATH: 0
TROUBLE SWALLOWING: 0
ABDOMINAL PAIN: 1
DIARRHEA: 0
BLOOD IN STOOL: 0
VOMITING: 0

## 2022-11-29 NOTE — PROGRESS NOTES
[facility]  [id number] [facility] [address]  Dept: [phone]  Dept Fax: [phone]    Patient Care Team:  DAVIN Larios CNP as PCP - General (Family Medicine)  DAVIN Larios CNP as PCP - [facility] Empaneled Provider    2022     Thera Expose (:  1975)is a 50 y.o. female, here for evaluation of the following medical concerns:   Chief Complaint   Patient presents with    Hypertension     6W F/U       Patient here for blood pressure follow-up, she is happy to states that blood pressure was at goal when she saw gynecology last week. Sruthi Wagner is asking to restart gabapentin for neck pain causing discomfort in right arm. She states the medication was prescribed for her about 1 year ago and she did see benefit with the medication. She states that her abdominal pain has been well controlled since her last visit, having days with mild to minimal pain. Sruthi Wagner and her significant other both admit that she had increased pain Thanks day, more related to increased activity and prolonged standing when cooking. She did see gynecology because of the pelvic pain, who at this time had advised pelvic floor rehab as well as a visit with general surgery for possible adhesions. .    Review of Systems   Constitutional:  Negative for appetite change, fever and unexpected weight change. HENT:  Negative for hearing loss and trouble swallowing. Eyes:  Negative for visual disturbance. Respiratory:  Negative for chest tightness, shortness of breath and wheezing. Cardiovascular:  Negative for chest pain and palpitations. Gastrointestinal:  Positive for abdominal pain. Negative for blood in stool, diarrhea and vomiting. Endocrine: Negative for polydipsia and polyuria. Genitourinary:  Positive for pelvic pain. Negative for dysuria, frequency, hematuria and menstrual problem. Musculoskeletal:  Negative for myalgias and neck pain. Neurological:  Negative for seizures, syncope, facial asymmetry, numbness and headaches. Psychiatric/Behavioral:  Negative for suicidal ideas. The patient is not nervous/anxious. Prior to Visit Medications    Medication Sig Taking? Authorizing Provider   gabapentin (NEURONTIN) 300 MG capsule Take 1 capsule by mouth 3 times daily for 30 days. Intended supply: 90 days Yes DAVIN Rodriguez CNP   acetaminophen (TYLENOL) 500 MG tablet Take 1 tablet by mouth 4 times daily as needed for Pain Yes Lois Beatty DO   losartan (COZAAR) 100 MG tablet Take 1 tablet by mouth daily Yes DAVIN Rodriguez CNP   dicyclomine (BENTYL) 10 MG capsule Take 1 capsule by mouth 4 times daily Yes DAVIN Rodriguez CNP   estradiol (VIVELLE) 0.1 MG/24HR Place 1 patch onto the skin Twice a Week Yes Historical Provider, MD   ibuprofen (ADVIL;MOTRIN) 800 MG tablet take 1 tablet by mouth three times a day Yes Historical Provider, MD   acetaminophen (TYLENOL) 325 MG tablet Take 2 tablets by mouth every 6 hours as needed for Pain Yes Sridevi Hebert DO        Social History     Tobacco Use    Smoking status: Every Day     Packs/day: 1.00     Years: 20.00     Pack years: 20.00     Types: Cigarettes    Smokeless tobacco: Never   Substance Use Topics    Alcohol use: Yes     Comment: 2-3 16 oz a day        Vitals:    11/29/22 1339 11/29/22 1405   BP: (!) 135/90 (!) 138/100   Site: Right Upper Arm    Position: Sitting    Pulse: 91 96   Temp: 97.5 °F (36.4 °C)    TempSrc: Infrared    SpO2: 98%    Weight: 129 lb (58.5 kg)      Estimated body mass index is 24.37 kg/m² as calculated from the following:    Height as of 11/1/22: 5' 1\" (1.549 m). Weight as of this encounter: 129 lb (58.5 kg).     DIAGNOSTIC FINDINGS:  CBC:  Lab Results   Component Value Date/Time    WBC 7.4 06/13/2021 02:47 PM    HGB 14.6 06/13/2021 02:47 PM     06/13/2021 02:47 PM       BMP:    Lab Results   Component Value Date/Time     06/13/2021 02:47 PM    K 4.0 06/13/2021 02:47 PM     06/13/2021 02:47 PM    CO2 19 06/13/2021 02:47 PM    BUN 13 06/13/2021 02:47 PM    CREATININE 0.72 06/13/2021 02:47 PM    GLUCOSE 92 06/13/2021 02:47 PM       HEMOGLOBIN A1C: No results found for: LABA1C    FASTING LIPID PANEL:No results found for: CHOL, HDL, TRIG    Physical Exam  Vitals and nursing note reviewed. Constitutional:       General: She is not in acute distress. Appearance: She is well-developed. She is not ill-appearing. HENT:      Head: Normocephalic. Eyes:      General: No scleral icterus. Pupils: Pupils are equal, round, and reactive to light. Cardiovascular:      Rate and Rhythm: Normal rate and regular rhythm. Pulmonary:      Effort: Pulmonary effort is normal.      Breath sounds: Normal breath sounds. Abdominal:      Palpations: Abdomen is soft. Tenderness: There is no abdominal tenderness. There is no right CVA tenderness, left CVA tenderness or guarding. Musculoskeletal:      Cervical back: Normal range of motion and neck supple. Skin:     General: Skin is warm and dry. Neurological:      Mental Status: She is alert and oriented to person, place, and time. Coordination: Coordination normal.   Psychiatric:         Behavior: Behavior normal. Behavior is cooperative. Thought Content: Thought content normal.         Judgment: Judgment normal.       ASSESSMENT     Diagnosis Orders   1. Primary hypertension        2. Cervical radiculopathy  gabapentin (NEURONTIN) 300 MG capsule      3. Personal history of tobacco use, presenting hazards to health  [facility] TOBACCO USE CESSATION INTERMEDIATE 3-10 MINUTES [19346]             PLAN:  Orders Placed This Encounter   Medications    gabapentin (NEURONTIN) 300 MG capsule     Sig: Take 1 capsule by mouth 3 times daily for 30 days.  Intended supply: 90 days     Dispense:  90 capsule     Refill:  1         Blood pressure improved, readings were at goal last week at gynecology. Continue current treatment at this time and reevaluate at follow-up if dosing adjustment necessary. Agreed to restart gabapentin, ADRs reviewed. Discussed benefits of pelvic floor therapy, patient agreeable to scheduling as recommended by gynecology. FOLLOW UP AND INSTRUCTIONS:  Return in about 6 months (around 5/29/2023) for HTN. Ajit Moncada received counseling on the following healthy behaviors:medication adherence and tobacco cessation    Discussed use, benefit, and side effects of prescribed medications. Barriers to  medication compliance addressed. All patient questions answered. Pt  verbalized understanding of all instructions given. Patient given educational materials - see patient instructions      Patient advised to contact scheduling offices for any referrals or imaging orders  placed today if they have not been contacted in 48 hours. Return in about 6 months (around 5/29/2023) for HTN. An electronic signature was used to authenticate this note. --DAVIN Au CNP on 11/29/2022 at 7:59 PM  Visit Information    Have you changed or started any medications since your last visit including any over-the-counter medicines, vitamins, or herbal medicines? no   Are you having any side effects from any of your medications? -  no  Have you stopped taking any of your medications? Is so, why? -  no    Have you seen any other physician or provider since your last visit? Yes - Records Obtained  Have you had any other diagnostic tests since your last visit? Yes - Records Obtained  Have you been seen in the emergency room and/or had an admission to a hospital since we last saw you? Yes - Records Obtained  Have you had your routine dental cleaning in the past 6 months? no    Have you activated your "[employer] account? If not, what are your barriers?  Yes     Patient Care Team:  DAVIN Au CNP as PCP - General (Family Medicine)  DAVIN Donnelly - CNP as PCP - [facility] Empaneled Provider    Medical History Review  Past Medical, Family, and Social History reviewed and does not contribute to the patient presenting condition    Health Maintenance   Topic Date Due    COVID-19 Vaccine (1) Never done    HIV screen  Never done    Hepatitis C screen  Never done    DTaP/Tdap/Td vaccine (1 - Tdap) Never done    Lipids  Never done    Pneumococcal 0-64 years Vaccine (1 - PCV) 01/02/2023 (Originally 3/21/1981)    Flu vaccine (1) 10/12/2023 (Originally 8/1/2022)    Depression Screen  08/17/2023    Colorectal Cancer Screen  08/24/2025    Hepatitis A vaccine  Aged Out    Hib vaccine  Aged Out    Meningococcal (ACWY) vaccine  Aged Out

## 2022-12-07 ENCOUNTER — OFFICE VISIT (OUTPATIENT)
Dept: SURGERY | Age: 47
End: 2022-12-07
Payer: COMMERCIAL

## 2022-12-07 VITALS
HEART RATE: 92 BPM | BODY MASS INDEX: 23.43 KG/M2 | SYSTOLIC BLOOD PRESSURE: 134 MMHG | TEMPERATURE: 98.1 F | WEIGHT: 124 LBS | DIASTOLIC BLOOD PRESSURE: 87 MMHG

## 2022-12-07 DIAGNOSIS — R10.9 NONSPECIFIC ABDOMINAL PAIN: Primary | ICD-10-CM

## 2022-12-07 PROCEDURE — G8484 FLU IMMUNIZE NO ADMIN: HCPCS | Performed by: STUDENT IN AN ORGANIZED HEALTH CARE EDUCATION/TRAINING PROGRAM

## 2022-12-07 PROCEDURE — 4004F PT TOBACCO SCREEN RCVD TLK: CPT | Performed by: STUDENT IN AN ORGANIZED HEALTH CARE EDUCATION/TRAINING PROGRAM

## 2022-12-07 PROCEDURE — G8427 DOCREV CUR MEDS BY ELIG CLIN: HCPCS | Performed by: STUDENT IN AN ORGANIZED HEALTH CARE EDUCATION/TRAINING PROGRAM

## 2022-12-07 PROCEDURE — 3079F DIAST BP 80-89 MM HG: CPT | Performed by: STUDENT IN AN ORGANIZED HEALTH CARE EDUCATION/TRAINING PROGRAM

## 2022-12-07 PROCEDURE — 99204 OFFICE O/P NEW MOD 45 MIN: CPT | Performed by: STUDENT IN AN ORGANIZED HEALTH CARE EDUCATION/TRAINING PROGRAM

## 2022-12-07 PROCEDURE — 3075F SYST BP GE 130 - 139MM HG: CPT | Performed by: STUDENT IN AN ORGANIZED HEALTH CARE EDUCATION/TRAINING PROGRAM

## 2022-12-07 PROCEDURE — G8420 CALC BMI NORM PARAMETERS: HCPCS | Performed by: STUDENT IN AN ORGANIZED HEALTH CARE EDUCATION/TRAINING PROGRAM

## 2022-12-07 NOTE — PROGRESS NOTES
Visit Information    Have you changed or started any medications since your last visit including any over-the-counter medicines, vitamins, or herbal medicines? no   Have you stopped taking any of your medications? Is so, why? -  no  Are you having any side effects from any of your medications? - no    Have you seen any other physician or provider since your last visit?  no   Have you had any other diagnostic tests since your last visit?  no   Have you been seen in the emergency room and/or had an admission in a hospital since we last saw you?  no   Have you had your routine dental cleaning in the past 6 months?  no     Do you have an active MyChart account? If no, what is the barrier?   No:     Patient Care Team:  DAVIN Dupree CNP as PCP - General (Family Medicine)  DAVIN Dupree CNP as PCP - [facility] Provider  Carline Lacy DO as Consulting Physician (General Surgery)    Medical History Review  Past Medical, Family, and Social History reviewed and does not contribute to the patient presenting condition    Health Maintenance   Topic Date Due    COVID-19 Vaccine (1) Never done    HIV screen  Never done    Hepatitis C screen  Never done    DTaP/Tdap/Td vaccine (1 - Tdap) Never done    Lipids  Never done    Pneumococcal 0-64 years Vaccine (1 - PCV) 01/02/2023 (Originally 3/21/1981)    Flu vaccine (1) 10/12/2023 (Originally 8/1/2022)    Depression Screen  08/17/2023    Colorectal Cancer Screen  08/24/2025    Hepatitis A vaccine  Aged Out    Hib vaccine  Aged Out    Meningococcal (ACWY) vaccine  Aged Out

## 2022-12-07 NOTE — PROGRESS NOTES
History and Physical  [facility]    Patient's Name/Date of Birth: Winifred Rodgers / 1975 (22 y.o.)    Date: 2022     HPI: Pt is a 50 y.o. female who presents to [facility] for evaluation of persistent abdominal pain. Patient was referred by gynecology for persistent bilateral abdominal pain. Patient states that she has had this reoccurring pain intermittently over the last 7 months. She had brief episode of diarrhea 2 months ago which has resolved. She denies any history of constipation. Denies any bright red blood per rectum or tarry stools. She denies any nausea or vomiting. Pain occurs at rest and with movement. She cannot pinpoint any alleviating or aggravating factors. Patient has been evaluated by gynecology with a pelvic ultrasound. Previous history of bilateral salpingo-oophorectomy with hysterectomy. There is concern for thickening of her bladder and she was referred to urology, work-up was also negative. Patient is to follow-up with gastroenterology. She has a colonoscopy scheduled on 12/15/2022. Patient smokes half pack of cigarettes per day and drinks alcohol daily. Denies any family history of Crohn's disease, ulcerative colitis, or irritable bowel. Past Medical History:   Diagnosis Date    Current smoker     Cysts of both ovaries        Past Surgical History:   Procedure Laterality Date     SECTION      YANICK AND BSO (CERVIX REMOVED)      YANICK-BSO, appendectomy       Current Outpatient Medications   Medication Sig Dispense Refill    gabapentin (NEURONTIN) 300 MG capsule Take 1 capsule by mouth 3 times daily for 30 days.  Intended supply: 90 days 90 capsule 1    losartan (COZAAR) 100 MG tablet Take 1 tablet by mouth daily 30 tablet 2    dicyclomine (BENTYL) 10 MG capsule Take 1 capsule by mouth 4 times daily 120 capsule 5    estradiol (VIVELLE) 0.1 MG/24HR Place 1 patch onto the skin Twice a Week      ibuprofen (ADVIL;MOTRIN) 800 MG tablet take 1 tablet by mouth three times a day      acetaminophen (TYLENOL) 325 MG tablet Take 2 tablets by mouth every 6 hours as needed for Pain 20 tablet 0    acetaminophen (TYLENOL) 500 MG tablet Take 1 tablet by mouth 4 times daily as needed for Pain 30 tablet 1     No current facility-administered medications for this visit. Allergies   Allergen Reactions    Ampicillin        Family History   Problem Relation Age of Onset    Heart Disease Mother     Heart Disease Brother 48    Cancer Neg Hx        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    Sexual activity: Yes     Partners: Male     Birth control/protection: Other-see comments     Comment: tubal   Other Topics Concern    Not on file   Social History Narrative    Not on file     Social Determinants of Health     Financial Resource Strain: Low Risk     Difficulty of Paying Living Expenses: Not hard at all   Food Insecurity: No Food Insecurity    Worried About Running Out of Food in the Last Year: Never true    Ran Out of Food in the Last Year: Never true   Transportation Needs: No Transportation Needs    Lack of Transportation (Medical): No    Lack of Transportation (Non-Medical):  No   Physical Activity: Not on file   Stress: Not on file   Social Connections: Not on file   Intimate Partner Violence: Not on file   Housing Stability: Not on file       ROS: History obtained from the patient  General ROS: negative for - chills, fatigue, or fever  Endocrine ROS: negative for - malaise/lethargy or palpitations  Respiratory ROS: no cough, shortness of breath, or wheezing  Cardiovascular ROS: no chest pain or dyspnea on exertion  Gastrointestinal ROS: positive for - abdominal pain, negative for changes in appetite, constipation or diarrhea  Neurological ROS: negative for - speech problems or visual changes  Dermatological ROS: negative for - mole changes or pruritus  11 systems reviewed. Negative other than those noted above. Physical Exam:  Vitals:    12/07/22 0946   BP: 134/87   Pulse: 92   Temp: 98.1 °F (36.7 °C)     General:A & O x3  HEENT:  NCAT, PERRL, EMOI, oral mucus membrane pink and moist, no mass palpated on neck exam  Heart: RRR  Lungs: Respirations are easy and symmetric  Abdomen: soft, nondistended, tenderness to palpation in right lower quadrant at inguinal ligament, mild left lower quadrant tenderness to palpation, no palpable masses, midline hysterectomy incision well-healed. No rebound tenderness, nonperitoneal  Extremity: negative  SKIN: Skin color, texture, turgor normal. No rashes or lesions. Neuro: CN II-XII grossly intact. No motor or sensory deficits appreciated. MK:normal throughout upper and lower extremities    Assessment      Diagnosis Orders   1. Nonspecific abdominal pain            Plan     No acute surgical intervention indicated at this time. Patient's pain is nonspecific. Patient is scheduled for colonoscopy with gastroenterology, if colonoscopy identifies pathology that would require surgical intervention patient may return to clinic for reevaluation. Barb Montanez MD  12/7/2022    Attending Physician Statement  I have discussed the case with Dr Krysten Perales, including pertinent history and exam findings with the resident. I have seen and examined the patient and the key elements of the encounter have been performed by me. I agree with the assessment, plan and orders as documented by the resident.       Electronically signed by [facility] Mchenry Malick Mei IV, DO  on 12/14/2022 at 10:31 AM

## 2022-12-07 NOTE — PATIENT INSTRUCTIONS
Thank you letting us take care of you today. We hope that all your questions were addressed. If a question was overlooked or something else comes to mind after you return home, please call our office at [phone]. If you need to cancel or change an appointment, surgery or procedure, please contact the office at [phone].

## 2023-01-17 ENCOUNTER — PATIENT MESSAGE (OUTPATIENT)
Dept: PRIMARY CARE CLINIC | Age: 48
End: 2023-01-17

## 2023-01-17 NOTE — LETTER
[facility]  [address] [address]  Phone: [phone]  Fax: [phone] [facility], APRN - [facility]    Carole Waddell  1975        January 27, 2023      To whom it may concern,    Carole Waddell is a patient under my care, established in August 2022. She has been experiencing chronic pain and undergoing evaluation by multiple specialties. At this time there is still no definitive diagnosis and she reports inability to complete tasks such as filing papers. At this time I recommend patient can sit for up to 20 consecutive minutes and prolonged walking or ambulation for up to 15-minutes.        Sincerely,        Vane Johnson, DAVIN - CNP

## 2023-01-17 NOTE — TELEPHONE ENCOUNTER
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 I didn't do well at it so she requested that I get a doctor's note from December and January for December and January also I'm not really sure what we do about this pain that I keep having it's not as constant as it was but it definitely still comes so I just more or less wanted to talk to you about that at some point the fax number is [phone] attention work activities/snap ET if you could send that in that would be great and then I don't know what kind of plan we can even come up with karl we really don't know what's going on but I just didn't the pain is exhausting to me sometimes I'm just I don't know what to do thank you

## 2023-01-29 DIAGNOSIS — M54.12 CERVICAL RADICULOPATHY: ICD-10-CM

## 2023-01-30 RX ORDER — GABAPENTIN 300 MG/1
CAPSULE ORAL
Qty: 90 CAPSULE | Refills: 1 | Status: SHIPPED | OUTPATIENT
Start: 2023-01-30 | End: 2023-03-01

## 2023-01-30 NOTE — TELEPHONE ENCOUNTER
LAST VISIT:   11/29/2022     Future Appointments   Date Time Provider Cedrick Gilliam   5/30/2023  1:30 PM DAVIN Carbajal - [facility]  Leonidas Pollock

## 2023-04-03 DIAGNOSIS — M54.12 CERVICAL RADICULOPATHY: ICD-10-CM

## 2023-04-03 RX ORDER — GABAPENTIN 300 MG/1
300 CAPSULE ORAL 3 TIMES DAILY
Qty: 90 CAPSULE | Refills: 1 | Status: SHIPPED | OUTPATIENT
Start: 2023-04-03 | End: 2023-05-03

## 2023-04-03 NOTE — TELEPHONE ENCOUNTER
Last visit: 11/29/2022  Last Med refill: 1/30/2023  Does patient have enough medication for 72 hours: No:     Next Visit Date:  Future Appointments   Date Time Provider Cedrick Gilliam   5/30/2023  1:30 PM [facility] Maintenance   Topic Date Due    COVID-19 Vaccine (1) Never done    Pneumococcal 0-64 years Vaccine (1 - PCV) Never done    HIV screen  Never done    Hepatitis C screen  Never done    DTaP/Tdap/Td vaccine (1 - Tdap) Never done    Lipids  Never done    Shingles vaccine (1 of 2) Never done    Low dose CT lung screening  Never done    Breast cancer screen  03/21/2023    Flu vaccine (Season Ended) 10/12/2023 (Originally 8/1/2023)    Depression Screen  08/17/2023    Colorectal Cancer Screen  08/24/2025    Hepatitis A vaccine  Aged Out    Hib vaccine  Aged Out    Meningococcal (ACWY) vaccine  Aged Out       No results found for: LABA1C          ( goal A1C is < 7)   No results found for: LABMICR  No results found for: LDLCHOLESTEROL, [facility]    (goal LDL is <100)   BUN (mg/dL)   Date Value   06/13/2021 13     BP Readings from Last 3 Encounters:   12/07/22 134/87   11/29/22 (!) 138/100   11/22/22 135/88          (goal 120/80)    All Future Testing planned in [facility]  Lab Frequency Next Occurrence   Lipid Panel Once 09/17/2022   CBC with Auto Differential Once 10/12/2022   TSH With Reflex Ft4 Once 10/12/2022   JAISON DIGITAL SCREEN W OR WO CAD BILATERAL Once 02/22/2023               Patient Active Problem List:     Right lower quadrant pain     Hx of YANICK-BSO (2019)     Hypertension     Hx of appendectomy (2019)     Hx of PID

## 2023-05-30 ENCOUNTER — OFFICE VISIT (OUTPATIENT)
Dept: PRIMARY CARE CLINIC | Age: 48
End: 2023-05-30
Payer: COMMERCIAL

## 2023-05-30 VITALS
OXYGEN SATURATION: 98 % | SYSTOLIC BLOOD PRESSURE: 133 MMHG | HEART RATE: 89 BPM | BODY MASS INDEX: 23.28 KG/M2 | WEIGHT: 123.2 LBS | DIASTOLIC BLOOD PRESSURE: 84 MMHG

## 2023-05-30 DIAGNOSIS — F41.9 ANXIETY: ICD-10-CM

## 2023-05-30 DIAGNOSIS — M54.12 CERVICAL RADICULOPATHY: ICD-10-CM

## 2023-05-30 DIAGNOSIS — M79.89 MASS OF SOFT TISSUE: ICD-10-CM

## 2023-05-30 DIAGNOSIS — I10 PRIMARY HYPERTENSION: Primary | ICD-10-CM

## 2023-05-30 DIAGNOSIS — M21.612 BUNION OF GREAT TOE OF LEFT FOOT: ICD-10-CM

## 2023-05-30 DIAGNOSIS — Z87.891 PERSONAL HISTORY OF TOBACCO USE: ICD-10-CM

## 2023-05-30 PROCEDURE — G8420 CALC BMI NORM PARAMETERS: HCPCS | Performed by: NURSE PRACTITIONER

## 2023-05-30 PROCEDURE — 3075F SYST BP GE 130 - 139MM HG: CPT | Performed by: NURSE PRACTITIONER

## 2023-05-30 PROCEDURE — 3017F COLORECTAL CA SCREEN DOC REV: CPT | Performed by: NURSE PRACTITIONER

## 2023-05-30 PROCEDURE — 99214 OFFICE O/P EST MOD 30 MIN: CPT | Performed by: NURSE PRACTITIONER

## 2023-05-30 PROCEDURE — G8427 DOCREV CUR MEDS BY ELIG CLIN: HCPCS | Performed by: NURSE PRACTITIONER

## 2023-05-30 PROCEDURE — 4004F PT TOBACCO SCREEN RCVD TLK: CPT | Performed by: NURSE PRACTITIONER

## 2023-05-30 PROCEDURE — 3079F DIAST BP 80-89 MM HG: CPT | Performed by: NURSE PRACTITIONER

## 2023-05-30 PROCEDURE — G0296 VISIT TO DETERM LDCT ELIG: HCPCS | Performed by: NURSE PRACTITIONER

## 2023-05-30 RX ORDER — LOSARTAN POTASSIUM 100 MG/1
100 TABLET ORAL DAILY
Qty: 30 TABLET | Refills: 2 | Status: SHIPPED | OUTPATIENT
Start: 2023-05-30 | End: 2023-08-28

## 2023-05-30 RX ORDER — BUSPIRONE HYDROCHLORIDE 10 MG/1
TABLET ORAL
Qty: 60 TABLET | Refills: 1 | Status: SHIPPED | OUTPATIENT
Start: 2023-05-30 | End: 2023-06-29

## 2023-05-30 RX ORDER — GABAPENTIN 300 MG/1
CAPSULE ORAL
Qty: 90 CAPSULE | Refills: 1 | Status: SHIPPED | OUTPATIENT
Start: 2023-05-30 | End: 2023-06-29

## 2023-05-30 SDOH — ECONOMIC STABILITY: FOOD INSECURITY: WITHIN THE PAST 12 MONTHS, THE FOOD YOU BOUGHT JUST DIDN'T LAST AND YOU DIDN'T HAVE MONEY TO GET MORE.: NEVER TRUE

## 2023-05-30 SDOH — ECONOMIC STABILITY: HOUSING INSECURITY
IN THE LAST 12 MONTHS, WAS THERE A TIME WHEN YOU DID NOT HAVE A STEADY PLACE TO SLEEP OR SLEPT IN A SHELTER (INCLUDING NOW)?: NO

## 2023-05-30 SDOH — ECONOMIC STABILITY: INCOME INSECURITY: HOW HARD IS IT FOR YOU TO PAY FOR THE VERY BASICS LIKE FOOD, HOUSING, MEDICAL CARE, AND HEATING?: NOT VERY HARD

## 2023-05-30 SDOH — ECONOMIC STABILITY: FOOD INSECURITY: WITHIN THE PAST 12 MONTHS, YOU WORRIED THAT YOUR FOOD WOULD RUN OUT BEFORE YOU GOT MONEY TO BUY MORE.: NEVER TRUE

## 2023-05-30 ASSESSMENT — PATIENT HEALTH QUESTIONNAIRE - PHQ9
SUM OF ALL RESPONSES TO PHQ QUESTIONS 1-9: 0
SUM OF ALL RESPONSES TO PHQ QUESTIONS 1-9: 0
SUM OF ALL RESPONSES TO PHQ9 QUESTIONS 1 & 2: 0
2. FEELING DOWN, DEPRESSED OR HOPELESS: 0
SUM OF ALL RESPONSES TO PHQ QUESTIONS 1-9: 0
1. LITTLE INTEREST OR PLEASURE IN DOING THINGS: 0
SUM OF ALL RESPONSES TO PHQ QUESTIONS 1-9: 0

## 2023-05-30 ASSESSMENT — ENCOUNTER SYMPTOMS
CHEST TIGHTNESS: 0
ABDOMINAL PAIN: 0
BACK PAIN: 1
TROUBLE SWALLOWING: 0
WHEEZING: 0
BLOOD IN STOOL: 0
DIARRHEA: 0
SHORTNESS OF BREATH: 0
VOMITING: 0

## 2023-05-30 NOTE — PROGRESS NOTES
currently exhibits no signs or symptoms suggestive of lung cancer. Discussed with patient the importance of compliance with yearly annual lung cancer screenings and willingness to undergo diagnosis and treatment if screening scan is positive. In addition, the patient was counseled regarding the importance of remaining smoke free and/or total smoking cessation.     Also reviewed the following if the patient has Medicare that as of February 10, 2022, Medicare only covers LDCT screening in patients aged 51-72 with at least a 20 pack-year smoking history who currently smoke or have quit in the last 15 years

## 2023-07-27 DIAGNOSIS — M54.12 CERVICAL RADICULOPATHY: ICD-10-CM

## 2023-07-27 DIAGNOSIS — F41.9 ANXIETY: ICD-10-CM

## 2023-07-27 RX ORDER — GABAPENTIN 300 MG/1
CAPSULE ORAL
Qty: 90 CAPSULE | Refills: 1 | Status: SHIPPED | OUTPATIENT
Start: 2023-07-27 | End: 2023-08-26

## 2023-07-27 RX ORDER — BUSPIRONE HYDROCHLORIDE 10 MG/1
TABLET ORAL
Qty: 60 TABLET | Refills: 1 | Status: SHIPPED | OUTPATIENT
Start: 2023-07-27

## 2023-08-28 DIAGNOSIS — I10 PRIMARY HYPERTENSION: ICD-10-CM

## 2023-08-28 RX ORDER — LOSARTAN POTASSIUM 100 MG/1
TABLET ORAL
Qty: 30 TABLET | Refills: 2 | Status: SHIPPED | OUTPATIENT
Start: 2023-08-28

## 2023-10-02 DIAGNOSIS — M54.12 CERVICAL RADICULOPATHY: ICD-10-CM

## 2023-10-02 DIAGNOSIS — F41.9 ANXIETY: ICD-10-CM

## 2023-10-02 RX ORDER — BUSPIRONE HYDROCHLORIDE 10 MG/1
TABLET ORAL
Qty: 60 TABLET | Refills: 1 | Status: SHIPPED | OUTPATIENT
Start: 2023-10-02

## 2023-10-02 RX ORDER — GABAPENTIN 300 MG/1
CAPSULE ORAL
Qty: 90 CAPSULE | Refills: 1 | Status: SHIPPED | OUTPATIENT
Start: 2023-10-02 | End: 2023-11-01

## 2023-11-28 DIAGNOSIS — F41.9 ANXIETY: ICD-10-CM

## 2023-11-28 DIAGNOSIS — I10 PRIMARY HYPERTENSION: ICD-10-CM

## 2023-11-28 DIAGNOSIS — M54.12 CERVICAL RADICULOPATHY: ICD-10-CM

## 2023-11-28 RX ORDER — GABAPENTIN 300 MG/1
CAPSULE ORAL
Qty: 90 CAPSULE | Refills: 1 | Status: SHIPPED | OUTPATIENT
Start: 2023-11-28 | End: 2023-12-28

## 2023-11-28 RX ORDER — BUSPIRONE HYDROCHLORIDE 10 MG/1
TABLET ORAL
Qty: 60 TABLET | Refills: 1 | Status: SHIPPED | OUTPATIENT
Start: 2023-11-28

## 2023-11-28 RX ORDER — LOSARTAN POTASSIUM 100 MG/1
TABLET ORAL
Qty: 30 TABLET | Refills: 2 | Status: SHIPPED | OUTPATIENT
Start: 2023-11-28

## 2024-01-10 ENCOUNTER — TELEPHONE (OUTPATIENT)
Dept: OBGYN | Age: 49
End: 2024-01-10

## 2024-01-10 NOTE — TELEPHONE ENCOUNTER
Patient is requesting a call regarding a refill for her hormone medication and can be reached at [phone]. Okay to leave a message.

## 2024-01-25 DIAGNOSIS — M54.12 CERVICAL RADICULOPATHY: ICD-10-CM

## 2024-01-25 RX ORDER — GABAPENTIN 300 MG/1
CAPSULE ORAL
Qty: 90 CAPSULE | Refills: 1 | Status: SHIPPED | OUTPATIENT
Start: 2024-01-25 | End: 2024-02-24

## 2024-01-26 DIAGNOSIS — F41.9 ANXIETY: ICD-10-CM

## 2024-01-26 RX ORDER — BUSPIRONE HYDROCHLORIDE 10 MG/1
TABLET ORAL
Qty: 60 TABLET | Refills: 1 | Status: SHIPPED | OUTPATIENT
Start: 2024-01-26

## 2024-01-29 ENCOUNTER — OFFICE VISIT (OUTPATIENT)
Dept: OBGYN | Age: 49
End: 2024-01-29
Payer: COMMERCIAL

## 2024-01-29 VITALS
BODY MASS INDEX: 24.56 KG/M2 | WEIGHT: 130 LBS | SYSTOLIC BLOOD PRESSURE: 149 MMHG | HEART RATE: 82 BPM | DIASTOLIC BLOOD PRESSURE: 90 MMHG

## 2024-01-29 DIAGNOSIS — Z90.722 HISTORY OF TOTAL ABDOMINAL HYSTERECTOMY AND BILATERAL SALPINGO-OOPHORECTOMY: ICD-10-CM

## 2024-01-29 DIAGNOSIS — Z90.710 HISTORY OF TOTAL ABDOMINAL HYSTERECTOMY AND BILATERAL SALPINGO-OOPHORECTOMY: ICD-10-CM

## 2024-01-29 DIAGNOSIS — Z90.79 HISTORY OF TOTAL ABDOMINAL HYSTERECTOMY AND BILATERAL SALPINGO-OOPHORECTOMY: ICD-10-CM

## 2024-01-29 DIAGNOSIS — N95.1 MENOPAUSAL SYMPTOMS: Primary | ICD-10-CM

## 2024-01-29 PROCEDURE — 3077F SYST BP >= 140 MM HG: CPT

## 2024-01-29 PROCEDURE — 3017F COLORECTAL CA SCREEN DOC REV: CPT

## 2024-01-29 PROCEDURE — G8427 DOCREV CUR MEDS BY ELIG CLIN: HCPCS

## 2024-01-29 PROCEDURE — 4004F PT TOBACCO SCREEN RCVD TLK: CPT

## 2024-01-29 PROCEDURE — 3080F DIAST BP >= 90 MM HG: CPT

## 2024-01-29 PROCEDURE — 99214 OFFICE O/P EST MOD 30 MIN: CPT

## 2024-01-29 PROCEDURE — G8420 CALC BMI NORM PARAMETERS: HCPCS

## 2024-01-29 PROCEDURE — 99212 OFFICE O/P EST SF 10 MIN: CPT

## 2024-01-29 PROCEDURE — G8484 FLU IMMUNIZE NO ADMIN: HCPCS

## 2024-01-29 NOTE — PROGRESS NOTES
education) and non face-to-face time (care coordination), spent in determining the total time component.

## 2024-02-05 RX ORDER — ESTRADIOL 0.1 MG/D
1 FILM, EXTENDED RELEASE TRANSDERMAL
Qty: 8 PATCH | Refills: 12 | Status: SHIPPED | OUTPATIENT
Start: 2024-02-05

## 2024-02-22 DIAGNOSIS — I10 PRIMARY HYPERTENSION: ICD-10-CM

## 2024-02-22 RX ORDER — LOSARTAN POTASSIUM 100 MG/1
TABLET ORAL
Qty: 30 TABLET | Refills: 0 | Status: SHIPPED | OUTPATIENT
Start: 2024-02-22

## 2024-02-22 NOTE — TELEPHONE ENCOUNTER
Medications and past visits were reviewed.  Patient needs to make an appointment to receive further refills on their medications.

## 2024-03-18 DIAGNOSIS — Z90.710 HISTORY OF TOTAL ABDOMINAL HYSTERECTOMY AND BILATERAL SALPINGO-OOPHORECTOMY: ICD-10-CM

## 2024-03-18 DIAGNOSIS — Z90.79 HISTORY OF TOTAL ABDOMINAL HYSTERECTOMY AND BILATERAL SALPINGO-OOPHORECTOMY: ICD-10-CM

## 2024-03-18 DIAGNOSIS — Z90.722 HISTORY OF TOTAL ABDOMINAL HYSTERECTOMY AND BILATERAL SALPINGO-OOPHORECTOMY: ICD-10-CM

## 2024-03-18 RX ORDER — ESTRADIOL 0.1 MG/D
FILM, EXTENDED RELEASE TRANSDERMAL
Qty: 8 PATCH | Refills: 12 | OUTPATIENT
Start: 2024-03-18

## 2024-03-21 DIAGNOSIS — M54.12 CERVICAL RADICULOPATHY: ICD-10-CM

## 2024-03-21 DIAGNOSIS — F41.9 ANXIETY: ICD-10-CM

## 2024-03-21 RX ORDER — BUSPIRONE HYDROCHLORIDE 10 MG/1
TABLET ORAL
Qty: 60 TABLET | Refills: 1 | OUTPATIENT
Start: 2024-03-21

## 2024-03-21 RX ORDER — GABAPENTIN 300 MG/1
CAPSULE ORAL
Qty: 90 CAPSULE | Refills: 1 | OUTPATIENT
Start: 2024-03-21 | End: 2024-04-20

## 2024-03-26 DIAGNOSIS — I10 PRIMARY HYPERTENSION: ICD-10-CM

## 2024-03-27 RX ORDER — LOSARTAN POTASSIUM 100 MG/1
TABLET ORAL
Qty: 30 TABLET | Refills: 0 | Status: SHIPPED | OUTPATIENT
Start: 2024-03-27

## 2024-04-09 ENCOUNTER — TELEPHONE (OUTPATIENT)
Dept: OBGYN | Age: 49
End: 2024-04-09

## 2024-04-09 DIAGNOSIS — Z90.722 HISTORY OF TOTAL ABDOMINAL HYSTERECTOMY AND BILATERAL SALPINGO-OOPHORECTOMY: ICD-10-CM

## 2024-04-09 DIAGNOSIS — Z90.79 HISTORY OF TOTAL ABDOMINAL HYSTERECTOMY AND BILATERAL SALPINGO-OOPHORECTOMY: ICD-10-CM

## 2024-04-09 DIAGNOSIS — Z90.710 HISTORY OF TOTAL ABDOMINAL HYSTERECTOMY AND BILATERAL SALPINGO-OOPHORECTOMY: ICD-10-CM

## 2024-04-09 RX ORDER — ESTRADIOL 0.1 MG/D
1 FILM, EXTENDED RELEASE TRANSDERMAL
Qty: 8 PATCH | Refills: 12 | Status: SHIPPED | OUTPATIENT
Start: 2024-04-11

## 2024-04-09 NOTE — TELEPHONE ENCOUNTER
Patient called the office stating she has not been able to get a refill on Estradiol patches. Writer called pharmacy and they stated the order was cancelled so they are requesting a new script.    Please advise.

## 2024-08-15 ENCOUNTER — HOSPITAL ENCOUNTER (EMERGENCY)
Age: 49
Discharge: HOME | End: 2024-08-15
Attending: EMERGENCY MEDICINE
Payer: COMMERCIAL

## 2024-08-15 ENCOUNTER — APPOINTMENT (OUTPATIENT)
Dept: GENERAL RADIOLOGY | Age: 49
End: 2024-08-15
Payer: COMMERCIAL

## 2024-08-15 VITALS
HEIGHT: 61 IN | DIASTOLIC BLOOD PRESSURE: 117 MMHG | SYSTOLIC BLOOD PRESSURE: 189 MMHG | TEMPERATURE: 97.9 F | RESPIRATION RATE: 24 BRPM | BODY MASS INDEX: 24.56 KG/M2 | HEART RATE: 90 BPM | OXYGEN SATURATION: 100 %

## 2024-08-15 DIAGNOSIS — S46.812A STRAIN OF LEFT TRAPEZIUS MUSCLE, INITIAL ENCOUNTER: Primary | ICD-10-CM

## 2024-08-15 LAB
ANION GAP SERPL CALCULATED.3IONS-SCNC: 7 MMOL/L (ref 9–16)
BASOPHILS # BLD: 0.05 K/UL (ref 0–0.2)
BASOPHILS NFR BLD: 1 % (ref 0–2)
BUN SERPL-MCNC: 10 MG/DL (ref 6–20)
CALCIUM SERPL-MCNC: 8.7 MG/DL (ref 8.6–10.4)
CHLORIDE SERPL-SCNC: 108 MMOL/L (ref 98–107)
CO2 SERPL-SCNC: 22 MMOL/L (ref 20–31)
CREAT SERPL-MCNC: 0.7 MG/DL (ref 0.5–0.9)
D DIMER PPP FEU-MCNC: 0.31 UG/ML FEU (ref 0–0.57)
EOSINOPHIL # BLD: 0.54 K/UL (ref 0–0.44)
EOSINOPHILS RELATIVE PERCENT: 9 % (ref 1–4)
ERYTHROCYTE [DISTWIDTH] IN BLOOD BY AUTOMATED COUNT: 13.3 % (ref 11.8–14.4)
GFR, ESTIMATED: >90 ML/MIN/1.73M2
GLUCOSE SERPL-MCNC: 95 MG/DL (ref 74–99)
HCT VFR BLD AUTO: 39.8 % (ref 36.3–47.1)
HGB BLD-MCNC: 13.8 G/DL (ref 11.9–15.1)
IMM GRANULOCYTES # BLD AUTO: <0.03 K/UL (ref 0–0.3)
IMM GRANULOCYTES NFR BLD: 0 %
INR PPP: 0.9
LYMPHOCYTES NFR BLD: 2.35 K/UL (ref 1.1–3.7)
LYMPHOCYTES RELATIVE PERCENT: 39 % (ref 24–43)
MCH RBC QN AUTO: 32.6 PG (ref 25.2–33.5)
MCHC RBC AUTO-ENTMCNC: 34.7 G/DL (ref 28.4–34.8)
MCV RBC AUTO: 94.1 FL (ref 82.6–102.9)
MONOCYTES NFR BLD: 0.63 K/UL (ref 0.1–1.2)
MONOCYTES NFR BLD: 10 % (ref 3–12)
NEUTROPHILS NFR BLD: 41 % (ref 36–65)
NEUTS SEG NFR BLD: 2.45 K/UL (ref 1.5–8.1)
NRBC BLD-RTO: 0 PER 100 WBC
PLATELET # BLD AUTO: 167 K/UL (ref 138–453)
PMV BLD AUTO: 10.1 FL (ref 8.1–13.5)
POTASSIUM SERPL-SCNC: 4.1 MMOL/L (ref 3.7–5.3)
PROTHROMBIN TIME: 12.5 SEC (ref 11.7–14.9)
RBC # BLD AUTO: 4.23 M/UL (ref 3.95–5.11)
SODIUM SERPL-SCNC: 137 MMOL/L (ref 136–145)
TROPONIN I SERPL HS-MCNC: <6 NG/L (ref 0–14)
TROPONIN I SERPL HS-MCNC: <6 NG/L (ref 0–14)
WBC OTHER # BLD: 6 K/UL (ref 3.5–11.3)

## 2024-08-15 PROCEDURE — 84484 ASSAY OF TROPONIN QUANT: CPT

## 2024-08-15 PROCEDURE — 96374 THER/PROPH/DIAG INJ IV PUSH: CPT

## 2024-08-15 PROCEDURE — 85379 FIBRIN DEGRADATION QUANT: CPT

## 2024-08-15 PROCEDURE — 71046 X-RAY EXAM CHEST 2 VIEWS: CPT

## 2024-08-15 PROCEDURE — 85610 PROTHROMBIN TIME: CPT

## 2024-08-15 PROCEDURE — 85025 COMPLETE CBC W/AUTO DIFF WBC: CPT

## 2024-08-15 PROCEDURE — 93005 ELECTROCARDIOGRAM TRACING: CPT | Performed by: EMERGENCY MEDICINE

## 2024-08-15 PROCEDURE — 6360000002 HC RX W HCPCS: Performed by: STUDENT IN AN ORGANIZED HEALTH CARE EDUCATION/TRAINING PROGRAM

## 2024-08-15 PROCEDURE — 80048 BASIC METABOLIC PNL TOTAL CA: CPT

## 2024-08-15 PROCEDURE — 99285 EMERGENCY DEPT VISIT HI MDM: CPT

## 2024-08-15 PROCEDURE — 6370000000 HC RX 637 (ALT 250 FOR IP): Performed by: STUDENT IN AN ORGANIZED HEALTH CARE EDUCATION/TRAINING PROGRAM

## 2024-08-15 RX ORDER — MORPHINE SULFATE 4 MG/ML
4 INJECTION INTRAVENOUS
Status: COMPLETED | OUTPATIENT
Start: 2024-08-15 | End: 2024-08-15

## 2024-08-15 RX ORDER — ACETAMINOPHEN 500 MG
1000 TABLET ORAL EVERY 6 HOURS PRN
Qty: 40 TABLET | Refills: 0 | Status: SHIPPED | OUTPATIENT
Start: 2024-08-15 | End: 2024-08-20

## 2024-08-15 RX ORDER — LIDOCAINE 50 MG/G
1 PATCH TOPICAL DAILY
Qty: 10 PATCH | Refills: 0 | Status: SHIPPED | OUTPATIENT
Start: 2024-08-15 | End: 2024-08-25

## 2024-08-15 RX ORDER — KETOROLAC TROMETHAMINE 10 MG/1
10 TABLET, FILM COATED ORAL EVERY 6 HOURS PRN
Qty: 20 TABLET | Refills: 0 | Status: SHIPPED | OUTPATIENT
Start: 2024-08-15 | End: 2024-08-20

## 2024-08-15 RX ORDER — CYCLOBENZAPRINE HCL 10 MG
10 TABLET ORAL 3 TIMES DAILY PRN
Qty: 15 TABLET | Refills: 0 | Status: SHIPPED | OUTPATIENT
Start: 2024-08-15 | End: 2024-08-20

## 2024-08-15 RX ORDER — DIAZEPAM 5 MG/1
5 TABLET ORAL ONCE
Status: COMPLETED | OUTPATIENT
Start: 2024-08-15 | End: 2024-08-15

## 2024-08-15 RX ORDER — IBUPROFEN 400 MG/1
600 TABLET ORAL
Status: COMPLETED | OUTPATIENT
Start: 2024-08-15 | End: 2024-08-15

## 2024-08-15 RX ADMIN — IBUPROFEN 600 MG: 400 TABLET, FILM COATED ORAL at 17:02

## 2024-08-15 RX ADMIN — DIAZEPAM 5 MG: 5 TABLET ORAL at 14:22

## 2024-08-15 RX ADMIN — MORPHINE SULFATE 4 MG: 4 INJECTION INTRAVENOUS at 15:03

## 2024-08-15 ASSESSMENT — LIFESTYLE VARIABLES
HOW OFTEN DO YOU HAVE A DRINK CONTAINING ALCOHOL: MONTHLY OR LESS
HOW MANY STANDARD DRINKS CONTAINING ALCOHOL DO YOU HAVE ON A TYPICAL DAY: 1 OR 2

## 2024-08-15 ASSESSMENT — PAIN - FUNCTIONAL ASSESSMENT: PAIN_FUNCTIONAL_ASSESSMENT: 0-10

## 2024-08-15 ASSESSMENT — PAIN SCALES - GENERAL: PAINLEVEL_OUTOF10: 9

## 2024-08-15 NOTE — ED PROVIDER NOTES
MG tablet Take 1 tablet by mouth every 6 hours as needed for Pain 8/15/24 8/20/24 Yes Lou Chopra MD   lidocaine (LIDODERM) 5 % Place 1 patch onto the skin daily for 10 days 12 hours on, 12 hours off. 8/15/24 8/25/24 Yes Lou Chopra MD   diclofenac sodium (VOLTAREN) 1 % GEL Apply 4 g topically 4 times daily as needed for Pain 8/15/24  Yes Lou Chopra MD   cyclobenzaprine (FLEXERIL) 10 MG tablet Take 1 tablet by mouth 3 times daily as needed for Muscle spasms 8/15/24 8/20/24 Yes Lou Chopra MD   estradiol (VIVELLE) 0.1 MG/24HR Place 1 patch onto the skin Twice a Week 4/11/24   King Cesar DO   losartan (COZAAR) 100 MG tablet take 1 tablet by mouth once daily 3/27/24   Crystal Ureña APRN - CNP   busPIRone (BUSPAR) 10 MG tablet take 0.5 tablet by mouth twice a day for 4 days then INCREASE 1 twice a day  Patient not taking: Reported on 1/29/2024 1/26/24   Luzmaria Avitia APRN - CNP   gabapentin (NEURONTIN) 300 MG capsule take 1 capsule by mouth three times a day 1/25/24 2/24/24  Luzmaria Avitia APRN - CNP   dicyclomine (BENTYL) 10 MG capsule Take 1 capsule by mouth 4 times daily  Patient not taking: Reported on 1/29/2024 9/29/22   Luzmaria Avitia, DAVIN - CNP       REVIEW OF SYSTEMS       See HPI    PHYSICAL EXAM      INITIAL VITALS:   BP (!) 189/117   Pulse 90   Temp 97.9 °F (36.6 °C) (Oral)   Resp 24   Ht 1.549 m (5' 1\")   SpO2 100%   BMI 24.56 kg/m²     Physical Exam  Vitals reviewed.   Constitutional:       General: She is in acute distress (Appears uncomfortable, moving around in the bed frequently and repositioning for comfort).      Appearance: She is not ill-appearing.   HENT:      Head: Normocephalic and atraumatic.      Right Ear: External ear normal.      Left Ear: External ear normal.      Nose: Nose normal.      Mouth/Throat:      Mouth: Mucous membranes are moist.   Eyes:      Conjunctiva/sclera: Conjunctivae normal.   Cardiovascular:      Rate and Rhythm: Normal  rate and regular rhythm.      Pulses: Normal pulses.   Pulmonary:      Effort: Pulmonary effort is normal.      Breath sounds: Normal breath sounds.   Abdominal:      General: Abdomen is flat.      Tenderness: There is no abdominal tenderness.   Musculoskeletal:         General: No deformity or signs of injury.      Comments: Tenderness to palpation of the left-sided paraspinal muscles into the trapezius, no reproducible pain to the chest, patient is holding left shoulder still due to pain but is able to move in good range of motion   Skin:     General: Skin is warm and dry.   Neurological:      General: No focal deficit present.      Mental Status: She is alert and oriented to person, place, and time.           DDX/DIAGNOSTIC RESULTS / EMERGENCY DEPARTMENT COURSE / MDM     Medical Decision Making  Patient is a 49-year-old female presenting due to back and chest pain.  Patient is notably hypertensive and tachycardic but is saturating well on room air and is not appear in any respiratory distress although she does appear very uncomfortable.  She is afebrile at this time and pulses in bilateral wrists appear equal.  There is no discernible abdominal pain and patient denies abdominal pain.  There is concern for aortic dissection at this time given hypertension and quality of pain.  Additionally there exists concern for pulmonary embolism or ACS.  Will obtain serial troponins, D-dimer and assess for risk for aortic dissection.  Will give Valium for muscle relaxant properties if possibly muscular spasm or strain which given the reproducible of pain with motion seems more likely.  Patient is a hard stick and IV access was attempted with ultrasound without success.    Amount and/or Complexity of Data Reviewed  Labs: ordered. Decision-making details documented in ED Course.  Radiology: ordered.    Risk  OTC drugs.  Prescription drug management.        EKG    All EKG's are interpreted by the Emergency Department Physician

## 2024-08-15 NOTE — ED PROVIDER NOTES
Note Started: 4:40 PM EDT     Faculty Sign-Out Attestation  Handoff taken on the following patient from prior Attending Physician at 1600: [place]    I was available and discussed any additional care issues that arose and coordinated the management plans with the resident(s) caring for the patient during my duty period. Any areas of disagreement with resident’s documentation of care or procedures are noted on the chart. I was personally present for the key portions of any/all procedures during my duty period. I have documented in the chart those procedures where I was not present during the key portions.    Chest pain, back pain, worse with movement.  Started after yard work.  Getting labs including D-dimer.  If labs and imaging are normal, possible discharge home    Bre Childers MD  Attending Physician        Bre Childers MD  08/15/24 5930

## 2024-08-15 NOTE — ED TRIAGE NOTES
Pt to ed c/o right shoulder pain, back pain and chest pain. Per pt this started on Monday and has not gone away, but has worsened. Pt states it is painful to take a deep breath. Pt states the pain started when she was cutting grass, but denies injury. Pt denies cardiac hx. Pt states she took tylenol with no relief.     Pt is alert and oriented x4. Ambulatory to room. Pt in gown, on full cardiac monitor. EKG done. Working iv access. Call light in reach. Will continue with plan of care.

## 2024-08-15 NOTE — ED PROVIDER NOTES
[facility]  Emergency Department  Faculty Attestation     I performed a history and physical examination of the patient and discussed management with the resident. I reviewed the resident’s note and agree with the documented findings and plan of care. Any areas of disagreement are noted on the chart. I was personally present for the key portions of any procedures. I have documented in the chart those procedures where I was not present during the key portions. I have reviewed the emergency nurses triage note. I agree with the chief complaint, past medical history, past surgical history, allergies, medications, social and family history as documented unless otherwise noted below.    For Physician Assistant/ Nurse Practitioner cases/documentation I have personally evaluated this patient and have completed at least one if not all key elements of the E/M (history, physical exam, and MDM). Additional findings are as noted.    Preliminary note started at 1:46 PM EDT    Primary Care Physician:  Luzmaria Avitia, DAVIN - CNP    Screenings:  [unfilled]    CHIEF COMPLAINT       Chief Complaint   Patient presents with    Chest Pain     Started Tuesday    Back Pain       RECENT VITALS:   BP (!) 189/117   Pulse 89   Temp 97.9 °F (36.6 °C) (Oral)   Resp 19   Ht 1.549 m (5' 1\")   SpO2 100%   BMI 24.56 kg/m²     LABS:  Labs Reviewed   CBC WITH AUTO DIFFERENTIAL - Abnormal; Notable for the following components:       Result Value    Eosinophils % 9 (*)     Eosinophils Absolute 0.54 (*)     All other components within normal limits   BASIC METABOLIC PANEL - Abnormal; Notable for the following components:    Chloride 108 (*)     Anion Gap 7 (*)     All other components within normal limits   TROPONIN   TROPONIN   D-DIMER, QUANTITATIVE   PROTIME-INR       Radiology  XR CHEST (2 VW)   Final Result   No acute process.             CRITICAL CARE: There was a high probability of clinically

## 2024-08-15 NOTE — DISCHARGE INSTRUCTIONS
You were evaluated due to pain in your back and chest.  You are likely suffering from some musculoskeletal strain.  We have prescribed you multiple medications that we recommend you take as needed for pain.  Please take the muscle relaxant Flexeril sparingly as it may cause some sleepiness.  Please be sure to stretch your shoulder and move regularly to avoid a frozen shoulder.    Please return to the ED if you develop worsening pain that is unable to be managed by the medications at home, worsening shortness of breath, chest pain, nausea, vomiting, fever, weakness in your arm, loss of sensation in your arm, chills, lightheadedness, dizziness or for any other concern.

## 2024-08-18 LAB
EKG ATRIAL RATE: 92 BPM
EKG P AXIS: 64 DEGREES
EKG P-R INTERVAL: 152 MS
EKG Q-T INTERVAL: 358 MS
EKG QRS DURATION: 82 MS
EKG QTC CALCULATION (BAZETT): 442 MS
EKG R AXIS: 41 DEGREES
EKG T AXIS: 59 DEGREES
EKG VENTRICULAR RATE: 92 BPM

## 2024-09-18 ENCOUNTER — HOSPITAL ENCOUNTER (EMERGENCY)
Age: 49
Discharge: HOME | End: 2024-09-18
Attending: STUDENT IN AN ORGANIZED HEALTH CARE EDUCATION/TRAINING PROGRAM
Payer: COMMERCIAL

## 2024-09-18 VITALS
DIASTOLIC BLOOD PRESSURE: 116 MMHG | OXYGEN SATURATION: 100 % | RESPIRATION RATE: 18 BRPM | HEART RATE: 94 BPM | TEMPERATURE: 98.2 F | SYSTOLIC BLOOD PRESSURE: 194 MMHG

## 2024-09-18 DIAGNOSIS — R59.1 LYMPHADENOPATHY OF HEAD AND NECK: Primary | ICD-10-CM

## 2024-09-18 PROCEDURE — 99283 EMERGENCY DEPT VISIT LOW MDM: CPT

## 2024-09-18 RX ORDER — NAPROXEN 500 MG/1
500 TABLET ORAL 2 TIMES DAILY WITH MEALS
Qty: 20 TABLET | Refills: 0 | Status: SHIPPED | OUTPATIENT
Start: 2024-09-18

## 2024-09-18 ASSESSMENT — PAIN DESCRIPTION - ORIENTATION: ORIENTATION: LEFT;RIGHT

## 2024-09-18 ASSESSMENT — ENCOUNTER SYMPTOMS
VOMITING: 0
NAUSEA: 0
ABDOMINAL PAIN: 0
COUGH: 0
SHORTNESS OF BREATH: 0

## 2024-09-18 ASSESSMENT — PAIN - FUNCTIONAL ASSESSMENT: PAIN_FUNCTIONAL_ASSESSMENT: 0-10

## 2024-09-18 ASSESSMENT — PAIN DESCRIPTION - LOCATION: LOCATION: HEAD

## 2024-09-18 ASSESSMENT — PAIN SCALES - GENERAL: PAINLEVEL_OUTOF10: 8

## 2024-10-07 ENCOUNTER — HOSPITAL ENCOUNTER (EMERGENCY)
Age: 49
Discharge: HOME | End: 2024-10-07
Attending: EMERGENCY MEDICINE
Payer: COMMERCIAL

## 2024-10-07 VITALS
OXYGEN SATURATION: 100 % | DIASTOLIC BLOOD PRESSURE: 116 MMHG | WEIGHT: 130 LBS | BODY MASS INDEX: 24.55 KG/M2 | HEART RATE: 72 BPM | SYSTOLIC BLOOD PRESSURE: 195 MMHG | HEIGHT: 61 IN | RESPIRATION RATE: 18 BRPM | TEMPERATURE: 98.1 F

## 2024-10-07 DIAGNOSIS — K08.89 PAIN, DENTAL: Primary | ICD-10-CM

## 2024-10-07 PROCEDURE — 99283 EMERGENCY DEPT VISIT LOW MDM: CPT

## 2024-10-07 PROCEDURE — 6370000000 HC RX 637 (ALT 250 FOR IP)

## 2024-10-07 RX ORDER — CLINDAMYCIN HCL 300 MG
300 CAPSULE ORAL 2 TIMES DAILY
Qty: 14 CAPSULE | Refills: 0 | Status: SHIPPED | OUTPATIENT
Start: 2024-10-07 | End: 2024-10-14

## 2024-10-07 RX ORDER — IBUPROFEN 600 MG/1
600 TABLET, FILM COATED ORAL EVERY 8 HOURS PRN
Qty: 30 TABLET | Refills: 0 | Status: SHIPPED | OUTPATIENT
Start: 2024-10-07 | End: 2024-10-14

## 2024-10-07 RX ORDER — IBUPROFEN 600 MG/1
600 TABLET, FILM COATED ORAL ONCE
Status: COMPLETED | OUTPATIENT
Start: 2024-10-07 | End: 2024-10-07

## 2024-10-07 RX ORDER — CLINDAMYCIN HCL 150 MG
150 CAPSULE ORAL EVERY 8 HOURS SCHEDULED
Status: DISCONTINUED | OUTPATIENT
Start: 2024-10-07 | End: 2024-10-07 | Stop reason: HOSPADM

## 2024-10-07 RX ADMIN — IBUPROFEN 600 MG: 600 TABLET, FILM COATED ORAL at 13:16

## 2024-10-07 RX ADMIN — CLINDAMYCIN HYDROCHLORIDE 150 MG: 150 CAPSULE ORAL at 13:16

## 2024-10-07 ASSESSMENT — ENCOUNTER SYMPTOMS
VOMITING: 0
ANAL BLEEDING: 0
TROUBLE SWALLOWING: 0
VOICE CHANGE: 0
PHOTOPHOBIA: 0
SINUS PRESSURE: 0
CONSTIPATION: 0
EYE DISCHARGE: 0
WHEEZING: 0
ABDOMINAL PAIN: 0
SORE THROAT: 0
ABDOMINAL DISTENTION: 0
SHORTNESS OF BREATH: 0
FACIAL SWELLING: 0
SINUS PAIN: 1
COUGH: 0
NAUSEA: 0
CHEST TIGHTNESS: 0

## 2024-10-07 ASSESSMENT — PAIN DESCRIPTION - LOCATION: LOCATION: TEETH

## 2024-10-07 ASSESSMENT — PAIN DESCRIPTION - ORIENTATION: ORIENTATION: UPPER;LEFT

## 2024-10-07 ASSESSMENT — PAIN SCALES - GENERAL: PAINLEVEL_OUTOF10: 8

## 2024-10-07 ASSESSMENT — LIFESTYLE VARIABLES
HOW MANY STANDARD DRINKS CONTAINING ALCOHOL DO YOU HAVE ON A TYPICAL DAY: 1 OR 2
HOW OFTEN DO YOU HAVE A DRINK CONTAINING ALCOHOL: MONTHLY OR LESS

## 2024-10-07 ASSESSMENT — PAIN - FUNCTIONAL ASSESSMENT: PAIN_FUNCTIONAL_ASSESSMENT: 0-10

## 2024-10-07 NOTE — ED NOTES
Pt complains of dental pain to the upper left side of mouth  Pt states mouth has been painful x 3 days  Pt is A+Ox4  Pt has family at the bedside  Pt denies any teeth that have been broken  Pt denies any difficulty swallowing   Pt denies any shortness of breath or difficulty breathing  All questions answered and needs met at this time

## 2024-10-07 NOTE — DISCHARGE INSTRUCTIONS
You were here for left sided upper toothache, on examination there is tenderness over upper lip and in the gum area.  As per you pain is radiating all over the left side of the face.  -There is no visible swelling, abscess, any pus drainage from the gum.  There is no history of fever, chills, rigors.  However, looks infected there is erythematous changes in the Gum.  -Take the medication as prescribed  -Also in ED initial evaluation showed your blood pressure was high, you are supposed to take losartan for high blood pressure which you are not taking because you said that he does not makes you feel good.  However you denied blurring of vision, headache, shortness of breath, chest pain.  -Follow-up with your PCP, for further workup of high blood pressure and may be adjustment of medication and dose.  -Follow-up with dentist as an outpatient for the dental pain and possible infection of tooth.  -Visit nearest ED if symptoms is worsening, shortness of breath, chest pain, blurring of vision, headache, nausea, vomiting, fever, chills, rigors or any acute concern.

## 2024-10-07 NOTE — ED PROVIDER NOTES
[facility]  Emergency Department  Faculty Attestation     I performed a history and physical examination of the patient and discussed management with the resident. I reviewed the resident’s note and agree with the documented findings and plan of care. Any areas of disagreement are noted on the chart. I was personally present for the key portions of any procedures. I have documented in the chart those procedures where I was not present during the key portions. I have reviewed the emergency nurses triage note. I agree with the chief complaint, past medical history, past surgical history, allergies, medications, social and family history as documented unless otherwise noted below.    For Physician Assistant/ Nurse Practitioner cases/documentation I have personally evaluated this patient and have completed at least one if not all key elements of the E/M (history, physical exam, and MDM). Additional findings are as noted.    Preliminary note started at 1:23 PM EDT    Primary Care Physician:  Luzmaria Avitia, DAVIN - CNP    Screenings:  [unfilled]    CHIEF COMPLAINT       Chief Complaint   Patient presents with    Dental Pain     Upper left       RECENT VITALS:   BP (!) 195/116   Pulse 72   Temp 98.1 °F (36.7 °C)   Resp 18   Ht 1.549 m (5' 1\")   Wt 59 kg (130 lb)   SpO2 100%   BMI 24.56 kg/m²     LABS:  Labs Reviewed - No data to display    Radiology  No orders to display         Attending Physician Additional  Notes  Patient has pain and swelling in her left upper canine for the past several days.  There is slight improvement since this morning when it was worse.  She is not taking her Antivert tensive medications because she states it makes her feel bad.  No headache stroke symptoms chest pain shortness of breath or back pain at present.  No fever chills or sweats.  No difficulty breathing.  On exam she is hypertensive, afebrile, vital signs are normal.  There is no

## 2024-10-07 NOTE — ED PROVIDER NOTES
[facility] ED  Emergency Department Encounter  Emergency Medicine Resident     Pt Name:Lu Vargas  MRN: [id number]  Birthdate 1975  Date of evaluation: 10/7/24  PCP:  Luzmaria Avitia APRN - CNP  Note Started: 12:52 PM EDT      CHIEF COMPLAINT       Chief Complaint   Patient presents with    Dental Pain     Upper left       HISTORY OF PRESENT ILLNESS  (Location/Symptom, Timing/Onset, Context/Setting, Quality, Duration, Modifying Factors, Severity.)      Lu Vargas is a 49 y.o. female who presents with upper left toothache that started 3 days ago.  According to the patient pain started in the left upper canine region and is progressive in nature.  Patient have history of missing tooth and denture problem.  There is no history of fever, chills, rigor, swelling, redness, discharge from the tooth.  However she states that the pain started on the upper left tooth but has been radiating all over the left side.  She denies any shortness of breath, chest pain, blurring of vision, headache, nausea, vomiting, fever.  Patient blood pressure in the ED is high but she does not give history of blurring vision, headache, palpitation, strokelike symptoms.  As per her she is supposed to take antihypertensive losartan but she has not been taking.  Patient is a current smoker.  PAST MEDICAL / SURGICAL / SOCIAL / FAMILY HISTORY      has a past medical history of Current smoker and Cysts of both ovaries.  She also has a history of high blood pressure and was supposed to be on losartan.  She is not compliant with her medication.       has a past surgical history that includes Total abdominal hysterectomy w/ bilateral salpingoophorectomy (2019) and  section.      Social History     Socioeconomic History    Marital status: Single     Spouse name: Not on file    Number of children: Not on file    Years of education: Not on file    Highest education level: Not on file   Occupational History    Not on

## 2024-10-28 RX ORDER — LIDOCAINE 50 MG/G
PATCH TOPICAL
Qty: 10 PATCH | OUTPATIENT
Start: 2024-10-28

## 2024-10-30 ENCOUNTER — HOSPITAL ENCOUNTER (EMERGENCY)
Age: 49
Discharge: HOME | End: 2024-10-30
Attending: EMERGENCY MEDICINE
Payer: COMMERCIAL

## 2024-10-30 VITALS
DIASTOLIC BLOOD PRESSURE: 107 MMHG | TEMPERATURE: 98.4 F | OXYGEN SATURATION: 97 % | RESPIRATION RATE: 18 BRPM | SYSTOLIC BLOOD PRESSURE: 185 MMHG | HEART RATE: 97 BPM

## 2024-10-30 DIAGNOSIS — K04.7 DENTAL INFECTION: Primary | ICD-10-CM

## 2024-10-30 PROCEDURE — 6370000000 HC RX 637 (ALT 250 FOR IP): Performed by: EMERGENCY MEDICINE

## 2024-10-30 PROCEDURE — 99283 EMERGENCY DEPT VISIT LOW MDM: CPT

## 2024-10-30 PROCEDURE — 96372 THER/PROPH/DIAG INJ SC/IM: CPT

## 2024-10-30 PROCEDURE — 6360000002 HC RX W HCPCS: Performed by: EMERGENCY MEDICINE

## 2024-10-30 RX ORDER — ACETAMINOPHEN 500 MG
500 TABLET ORAL EVERY 6 HOURS PRN
Qty: 40 TABLET | Refills: 0 | Status: SHIPPED | OUTPATIENT
Start: 2024-10-30 | End: 2024-11-09

## 2024-10-30 RX ORDER — LEVOFLOXACIN 500 MG/1
500 TABLET, FILM COATED ORAL ONCE
Status: DISCONTINUED | OUTPATIENT
Start: 2024-10-30 | End: 2024-10-30 | Stop reason: HOSPADM

## 2024-10-30 RX ORDER — IBUPROFEN 800 MG/1
800 TABLET, FILM COATED ORAL EVERY 8 HOURS PRN
Qty: 30 TABLET | Refills: 0 | Status: SHIPPED | OUTPATIENT
Start: 2024-10-30

## 2024-10-30 RX ORDER — KETOROLAC TROMETHAMINE 30 MG/ML
30 INJECTION, SOLUTION INTRAMUSCULAR; INTRAVENOUS ONCE
Status: COMPLETED | OUTPATIENT
Start: 2024-10-30 | End: 2024-10-30

## 2024-10-30 RX ORDER — METRONIDAZOLE 500 MG/1
500 TABLET ORAL ONCE
Status: COMPLETED | OUTPATIENT
Start: 2024-10-30 | End: 2024-10-30

## 2024-10-30 RX ORDER — LEVOFLOXACIN 500 MG/1
750 TABLET, FILM COATED ORAL DAILY
Qty: 11 TABLET | Refills: 0 | Status: SHIPPED | OUTPATIENT
Start: 2024-10-30 | End: 2024-11-06

## 2024-10-30 RX ORDER — METRONIDAZOLE 500 MG/1
500 TABLET ORAL 2 TIMES DAILY
Qty: 13 TABLET | Refills: 0 | Status: SHIPPED | OUTPATIENT
Start: 2024-10-30 | End: 2024-11-06

## 2024-10-30 RX ADMIN — KETOROLAC TROMETHAMINE 30 MG: 30 INJECTION, SOLUTION INTRAMUSCULAR; INTRAVENOUS at 12:35

## 2024-10-30 RX ADMIN — METRONIDAZOLE 500 MG: 500 TABLET ORAL at 12:35

## 2024-10-30 ASSESSMENT — ENCOUNTER SYMPTOMS
COLOR CHANGE: 0
VOMITING: 0
COUGH: 0
SORE THROAT: 0
FACIAL SWELLING: 1
SHORTNESS OF BREATH: 0
NAUSEA: 0
TROUBLE SWALLOWING: 0
RHINORRHEA: 0
EYE PAIN: 0
EYE DISCHARGE: 0

## 2024-10-30 NOTE — DISCHARGE INSTRUCTIONS
Read and follow all instructions.    Take medication as prescribed.    Return to the ER if symptoms worsen, you develop difficulty breathing or swallowing, or if you become concerned for any reason.

## 2024-10-30 NOTE — ED PROVIDER NOTES
[facility] ED  eMERGENCY dEPARTMENT eNCOUnter      Pt Name: Lu Vargas  MRN: [id number]  Birthdate 1975  Date of evaluation: 10/30/24      CHIEF COMPLAINT       Chief Complaint   Patient presents with    Dental Pain     X2 days         HISTORY OF PRESENT ILLNESS    Lu Vargas is a 49 y.o. female who presents with left upper dental pain and facial swelling.  She says she has had a toothache for the last few weeks.  Patient was on a 2-week course of clindamycin earlier this month and says that her symptoms did improve but now they are returned.  She says she has been unable to schedule an appoint with a dentist.  She denies difficulty breathing or swallowing.  She denies fever, chills, headache, nausea or vomiting.    Location/Symptom: dental pain and facial swelling  Timing/Onset: a few weeks  Context/Setting: hx of similar  Quality: achy  Duration: a few weeks  Modifying Factors: none  Severity: moderate      REVIEW OF SYSTEMS       Review of Systems   Constitutional:  Negative for chills and fever.   HENT:  Positive for dental problem and facial swelling. Negative for ear pain, rhinorrhea, sore throat and trouble swallowing.    Eyes:  Negative for pain and discharge.   Respiratory:  Negative for cough and shortness of breath.    Cardiovascular:  Negative for chest pain.   Gastrointestinal:  Negative for nausea and vomiting.   Musculoskeletal:  Negative for myalgias and neck pain.   Skin:  Negative for color change and rash.   Neurological:  Negative for dizziness and headaches.       PAST MEDICAL HISTORY    has a past medical history of Current smoker and Cysts of both ovaries.    SURGICAL HISTORY      has a past surgical history that includes Total abdominal hysterectomy w/ bilateral salpingoophorectomy (2019) and  section.    CURRENT MEDICATIONS       Discharge Medication List as of 10/30/2024 12:30 PM        CONTINUE these medications which have NOT CHANGED    Details    diclofenac sodium (VOLTAREN) 1 % GEL Apply 4 g topically 4 times daily as needed for Pain, Topical, 4 TIMES DAILY PRN Starting Thu 8/15/2024, Disp-50 g, R-0, Print      estradiol (VIVELLE) 0.1 MG/24HR Place 1 patch onto the skin Twice a Week, Disp-8 patch, R-12Normal      losartan (COZAAR) 100 MG tablet take 1 tablet by mouth once daily, Disp-30 tablet, R-0Normal      busPIRone (BUSPAR) 10 MG tablet take 0.5 tablet by mouth twice a day for 4 days then INCREASE 1 twice a day, Disp-60 tablet, R-1Normal      gabapentin (NEURONTIN) 300 MG capsule take 1 capsule by mouth three times a day, Disp-90 capsule, R-1Normal      dicyclomine (BENTYL) 10 MG capsule Take 1 capsule by mouth 4 times daily, Disp-120 capsule, R-5Normal             ALLERGIES     is allergic to ampicillin.    FAMILY HISTORY     She indicated that her mother is alive. She indicated that her brother is . She indicated that the status of her neg hx is unknown.     family history includes Heart Disease in her mother; Heart Disease (age of onset: 50) in her brother.    SOCIAL HISTORY      reports that she has been smoking cigarettes. She has a 20 pack-year smoking history. She has never used smokeless tobacco. She reports current alcohol use. She reports that she does not currently use drugs after having used the following drugs: Heroin.    PHYSICAL EXAM     INITIAL VITALS:  oral temperature is 98.4 °F (36.9 °C). Her blood pressure is 185/107 (abnormal) and her pulse is 97. Her respiration is 18 and oxygen saturation is 97%.      Physical Exam  Vitals and nursing note reviewed.   Constitutional:       Appearance: Normal appearance.   HENT:      Head: Normocephalic and atraumatic.      Mouth/Throat:      Mouth: Mucous membranes are moist.      Comments: Patient does have poor dentition.  No dental abscess seen.  There is mild edema to the left maxillary face without any overlying erythema or warmth.  There is no submandibular or submental edema or

## 2024-10-30 NOTE — ED NOTES
Pt presents to ED with c/o dental pain x2 days. Reports 10/10 pain. Denies taking any medication at home for pain. Patient stated she does not have a dentist at this time. Denies any other complaints at this time. Vitals obtained and triage completed. Call light in reach.

## 2024-11-22 ENCOUNTER — TELEPHONE (OUTPATIENT)
Dept: PRIMARY CARE CLINIC | Age: 49
End: 2024-11-22

## 2024-11-22 DIAGNOSIS — S82.831D CLOSED FRACTURE OF DISTAL END OF RIGHT FIBULA WITH ROUTINE HEALING, UNSPECIFIED FRACTURE MORPHOLOGY, SUBSEQUENT ENCOUNTER: Primary | ICD-10-CM

## 2024-11-22 NOTE — TELEPHONE ENCOUNTER
Patient was referred to [facility] orthopedics.  Please provide her with scheduling information.    There is also always the option for the Ortho walk-in clinic which is available at the [place] [facility] location if that becomes necessary

## 2024-11-22 NOTE — TELEPHONE ENCOUNTER
----- Message from Kieran BARKER sent at 11/22/2024 10:40 AM EST -----  Regarding: [facility] Referral Request  [facility] Referral Request    Reason for referral request: Specialty Provider    Specialist/Lab/Test patient is requesting (if known):ortho    Specialist Phone Number (if applicable):    Additional Information went to ER due to her ankle part got fractured. Need referral for ortho   --------------------------------------------------------------------------------------------------------------------------    Relationship to Patient: Self     Call Back Information: OK to leave message on voicemail  Preferred Call Back Number: Phone  [phone]

## 2024-11-25 ENCOUNTER — OFFICE VISIT (OUTPATIENT)
Dept: PRIMARY CARE CLINIC | Age: 49
End: 2024-11-25
Payer: COMMERCIAL

## 2024-11-25 VITALS
BODY MASS INDEX: 24.75 KG/M2 | TEMPERATURE: 97.5 F | WEIGHT: 131 LBS | OXYGEN SATURATION: 100 % | DIASTOLIC BLOOD PRESSURE: 103 MMHG | SYSTOLIC BLOOD PRESSURE: 186 MMHG | HEART RATE: 96 BPM

## 2024-11-25 DIAGNOSIS — S82.831D CLOSED FRACTURE OF DISTAL END OF RIGHT FIBULA WITH ROUTINE HEALING, UNSPECIFIED FRACTURE MORPHOLOGY, SUBSEQUENT ENCOUNTER: Primary | ICD-10-CM

## 2024-11-25 DIAGNOSIS — I10 PRIMARY HYPERTENSION: ICD-10-CM

## 2024-11-25 PROCEDURE — 3017F COLORECTAL CA SCREEN DOC REV: CPT | Performed by: NURSE PRACTITIONER

## 2024-11-25 PROCEDURE — 3077F SYST BP >= 140 MM HG: CPT | Performed by: NURSE PRACTITIONER

## 2024-11-25 PROCEDURE — G8427 DOCREV CUR MEDS BY ELIG CLIN: HCPCS | Performed by: NURSE PRACTITIONER

## 2024-11-25 PROCEDURE — 99213 OFFICE O/P EST LOW 20 MIN: CPT | Performed by: NURSE PRACTITIONER

## 2024-11-25 PROCEDURE — 4004F PT TOBACCO SCREEN RCVD TLK: CPT | Performed by: NURSE PRACTITIONER

## 2024-11-25 PROCEDURE — G8420 CALC BMI NORM PARAMETERS: HCPCS | Performed by: NURSE PRACTITIONER

## 2024-11-25 PROCEDURE — G8484 FLU IMMUNIZE NO ADMIN: HCPCS | Performed by: NURSE PRACTITIONER

## 2024-11-25 PROCEDURE — 3080F DIAST BP >= 90 MM HG: CPT | Performed by: NURSE PRACTITIONER

## 2024-11-25 RX ORDER — LOSARTAN POTASSIUM 100 MG/1
100 TABLET ORAL DAILY
Qty: 30 TABLET | Refills: 2 | Status: SHIPPED | OUTPATIENT
Start: 2024-11-25

## 2024-11-25 RX ORDER — MEDICAL SUPPLY, MISCELLANEOUS
EACH MISCELLANEOUS
Qty: 1 EACH | Refills: 0 | Status: SHIPPED | OUTPATIENT
Start: 2024-11-25

## 2024-11-25 RX ORDER — ACETAMINOPHEN 500 MG
500 TABLET ORAL EVERY 6 HOURS PRN
Qty: 40 TABLET | Refills: 0 | Status: SHIPPED | OUTPATIENT
Start: 2024-11-25 | End: 2024-12-05

## 2024-11-25 SDOH — ECONOMIC STABILITY: FOOD INSECURITY: WITHIN THE PAST 12 MONTHS, THE FOOD YOU BOUGHT JUST DIDN'T LAST AND YOU DIDN'T HAVE MONEY TO GET MORE.: NEVER TRUE

## 2024-11-25 SDOH — ECONOMIC STABILITY: INCOME INSECURITY: HOW HARD IS IT FOR YOU TO PAY FOR THE VERY BASICS LIKE FOOD, HOUSING, MEDICAL CARE, AND HEATING?: NOT HARD AT ALL

## 2024-11-25 SDOH — ECONOMIC STABILITY: FOOD INSECURITY: WITHIN THE PAST 12 MONTHS, YOU WORRIED THAT YOUR FOOD WOULD RUN OUT BEFORE YOU GOT MONEY TO BUY MORE.: NEVER TRUE

## 2024-11-25 NOTE — ASSESSMENT & PLAN NOTE
Patient noncompliant with oral medication, discussed risk of stroke, endorgan damage such as kidney failure.  Asymptomatic in office, agreeable to restarting losartan    Orders:    losartan (COZAAR) 100 MG tablet; Take 1 tablet by mouth daily

## 2024-11-25 NOTE — PATIENT INSTRUCTIONS
[facility]  [address]  [address] - Entr[address]  [place] [id number]  Tel: [phone]

## 2024-11-27 ENCOUNTER — OFFICE VISIT (OUTPATIENT)
Dept: ORTHOPEDIC SURGERY | Age: 49
End: 2024-11-27
Payer: COMMERCIAL

## 2024-11-27 VITALS — BODY MASS INDEX: 24.73 KG/M2 | WEIGHT: 131 LBS | HEIGHT: 61 IN

## 2024-11-27 DIAGNOSIS — S82.891A CLOSED FRACTURE OF RIGHT ANKLE, INITIAL ENCOUNTER: Primary | ICD-10-CM

## 2024-11-27 PROCEDURE — G8427 DOCREV CUR MEDS BY ELIG CLIN: HCPCS | Performed by: ORTHOPAEDIC SURGERY

## 2024-11-27 PROCEDURE — G8420 CALC BMI NORM PARAMETERS: HCPCS | Performed by: ORTHOPAEDIC SURGERY

## 2024-11-27 PROCEDURE — 99203 OFFICE O/P NEW LOW 30 MIN: CPT | Performed by: ORTHOPAEDIC SURGERY

## 2024-11-27 PROCEDURE — G8484 FLU IMMUNIZE NO ADMIN: HCPCS | Performed by: ORTHOPAEDIC SURGERY

## 2024-11-27 PROCEDURE — 3017F COLORECTAL CA SCREEN DOC REV: CPT | Performed by: ORTHOPAEDIC SURGERY

## 2024-11-27 PROCEDURE — 4004F PT TOBACCO SCREEN RCVD TLK: CPT | Performed by: ORTHOPAEDIC SURGERY

## 2024-11-27 RX ORDER — IBUPROFEN 800 MG/1
800 TABLET, FILM COATED ORAL
Qty: 90 TABLET | Refills: 0 | Status: SHIPPED | OUTPATIENT
Start: 2024-11-27

## 2024-11-28 NOTE — PROGRESS NOTES
Per patient she received kit for colonoscopy but has not been able to get done. Had mammogram done last year at [facility]    repeat imaging.  -Patient understands the current plan and is in agreement.      No follow-ups on file.    Orders Placed This Encounter   Medications    ibuprofen (ADVIL;MOTRIN) 800 MG tablet     Sig: Take 1 tablet by mouth 3 times daily (with meals)     Dispense:  90 tablet     Refill:  0       Orders Placed This Encounter   Procedures    XR ANKLE RIGHT (MIN 3 VIEWS)     Standing Status:   Future     Number of Occurrences:   1     Standing Expiration Date:   11/27/2025     Scheduling Instructions:      Ap/lat/mortise/gravity stress.        Ac Galloway DO  Orthopedic Surgery Resident, PGY-2  [place], [place]

## 2024-12-10 ENCOUNTER — TELEPHONE (OUTPATIENT)
Dept: PRIMARY CARE CLINIC | Age: 49
End: 2024-12-10

## 2024-12-10 DIAGNOSIS — I10 PRIMARY HYPERTENSION: Primary | ICD-10-CM

## 2024-12-10 RX ORDER — HYDROCHLOROTHIAZIDE 12.5 MG/1
12.5 TABLET ORAL DAILY
Qty: 30 TABLET | Refills: 3 | Status: SHIPPED | OUTPATIENT
Start: 2024-12-10

## 2024-12-10 NOTE — TELEPHONE ENCOUNTER
Have patient's stop taking losartan due to side effects, switch to hydrochlorothiazide 1 pill daily for blood pressure control.  Sent to her local pharmacy

## 2024-12-11 ENCOUNTER — OFFICE VISIT (OUTPATIENT)
Dept: ORTHOPEDIC SURGERY | Age: 49
End: 2024-12-11
Payer: COMMERCIAL

## 2024-12-11 VITALS — BODY MASS INDEX: 24.73 KG/M2 | HEIGHT: 61 IN | WEIGHT: 131 LBS

## 2024-12-11 DIAGNOSIS — M25.571 RIGHT ANKLE PAIN, UNSPECIFIED CHRONICITY: ICD-10-CM

## 2024-12-11 DIAGNOSIS — S82.61XD CLOSED DISP FRACTURE OF RIGHT LATERAL MALLEOLUS WITH ROUTINE HEALING: Primary | ICD-10-CM

## 2024-12-11 PROCEDURE — 99213 OFFICE O/P EST LOW 20 MIN: CPT | Performed by: ORTHOPAEDIC SURGERY

## 2024-12-11 PROCEDURE — 3017F COLORECTAL CA SCREEN DOC REV: CPT | Performed by: ORTHOPAEDIC SURGERY

## 2024-12-11 PROCEDURE — G8484 FLU IMMUNIZE NO ADMIN: HCPCS | Performed by: ORTHOPAEDIC SURGERY

## 2024-12-11 PROCEDURE — G8427 DOCREV CUR MEDS BY ELIG CLIN: HCPCS | Performed by: ORTHOPAEDIC SURGERY

## 2024-12-11 PROCEDURE — G8420 CALC BMI NORM PARAMETERS: HCPCS | Performed by: ORTHOPAEDIC SURGERY

## 2024-12-11 PROCEDURE — 4004F PT TOBACCO SCREEN RCVD TLK: CPT | Performed by: ORTHOPAEDIC SURGERY

## 2024-12-11 NOTE — PROGRESS NOTES
[facility]  [address]  [place] [address]  Dept: [phone]  Dept Fax: [phone]        Ambulatory   Follow Up    Subjective:   Lu Vargas is a 49 year old female who presents to our office today for evaluation of their right ankle pain as a result of a right distal fibula fracture.  The patient's injury originally occurred on 11/22/2024 when she had a twisting injury to the right ankle.  At that time the patient was able to partially ambulate on the right ankle however she was limited secondary to pain.  At the time of her injury she was placed in a splint by the emergency department.  She then presented to our office on 11/27/2024 where she was transition into a cam walking boot and did attempt to trial conservative management.  The patient has been noncompliant with the nonweightbearing status outside of the boot since that visit.  However, she has not experienced an increase in pain or swelling to the right ankle since that time.  She still does admit to taking Tylenol and ibuprofen occasionally for pain relief.  She denies icing or elevating the right ankle.  She denies any recent trauma to the right ankle since her last visit.    Review of Systems   Constitutional: Negative for Fever and Chills.   HENT: Negative for Congestion.    Eyes: Negative for Blurred Vision and Double Vision.   Respiratory: Negative for Cough, Shortness of Breath and Wheezing.    Cardiovascular: Negative for Chest Pain and Palpitations.   Gastrointestinal: Negative for Nausea. Negative for Vomiting.   Musculoskeletal: Positive for Myalgias and Joint Pain (right ankle).   Skin: Negative for Itching and Rash.   Neurological: Negative for Dizziness, Sensory Change and Headaches.   Psychiatric/Behavioral: Negative for Depression and Suicidal Ideas.     Objective:   General: AAOx3, NAD.  Ortho Exam  Neuro: Alert. Oriented.  Eyes: Extra-Ocular Muscles

## 2024-12-18 NOTE — PROGRESS NOTES
I performed a history and physical examination of the patient and discussed management with the resident. I reviewed the /resident physician note and agree with the documented findings and plan of care. Any areas of disagreement are noted on the chart.   I have personally evaluated this patient and have completed at least one if not all key elements of the E/M (history, physical exam,procedure and MDM).  Additional findings are as noted. I agree with the chief complaint, past medical history, past surgical history, allergies, medications, social and family history as documented unless otherwise noted below.     Electronically signed by SHELTON CHILEL DO on 12/18/2024 at 8:13 AM

## 2024-12-31 ENCOUNTER — OFFICE VISIT (OUTPATIENT)
Dept: PRIMARY CARE CLINIC | Age: 49
End: 2024-12-31
Payer: COMMERCIAL

## 2024-12-31 VITALS
SYSTOLIC BLOOD PRESSURE: 155 MMHG | DIASTOLIC BLOOD PRESSURE: 97 MMHG | WEIGHT: 131 LBS | BODY MASS INDEX: 24.73 KG/M2 | OXYGEN SATURATION: 99 % | HEART RATE: 82 BPM | TEMPERATURE: 97.3 F | HEIGHT: 61 IN

## 2024-12-31 DIAGNOSIS — I10 PRIMARY HYPERTENSION: ICD-10-CM

## 2024-12-31 DIAGNOSIS — R07.9 CHEST PAIN, UNSPECIFIED TYPE: Primary | ICD-10-CM

## 2024-12-31 DIAGNOSIS — R06.02 SHORTNESS OF BREATH: ICD-10-CM

## 2024-12-31 PROCEDURE — 99214 OFFICE O/P EST MOD 30 MIN: CPT | Performed by: NURSE PRACTITIONER

## 2024-12-31 PROCEDURE — G8427 DOCREV CUR MEDS BY ELIG CLIN: HCPCS | Performed by: NURSE PRACTITIONER

## 2024-12-31 PROCEDURE — G8484 FLU IMMUNIZE NO ADMIN: HCPCS | Performed by: NURSE PRACTITIONER

## 2024-12-31 PROCEDURE — 3077F SYST BP >= 140 MM HG: CPT | Performed by: NURSE PRACTITIONER

## 2024-12-31 PROCEDURE — 3080F DIAST BP >= 90 MM HG: CPT | Performed by: NURSE PRACTITIONER

## 2024-12-31 PROCEDURE — 3017F COLORECTAL CA SCREEN DOC REV: CPT | Performed by: NURSE PRACTITIONER

## 2024-12-31 PROCEDURE — 4004F PT TOBACCO SCREEN RCVD TLK: CPT | Performed by: NURSE PRACTITIONER

## 2024-12-31 PROCEDURE — G8420 CALC BMI NORM PARAMETERS: HCPCS | Performed by: NURSE PRACTITIONER

## 2024-12-31 RX ORDER — MEDICAL SUPPLY, MISCELLANEOUS
EACH MISCELLANEOUS
Qty: 1 EACH | Refills: 0 | Status: SHIPPED | OUTPATIENT
Start: 2024-12-31

## 2024-12-31 RX ORDER — HYDROCHLOROTHIAZIDE 25 MG/1
25 TABLET ORAL DAILY
Qty: 90 TABLET | Refills: 1 | Status: SHIPPED | OUTPATIENT
Start: 2024-12-31

## 2024-12-31 ASSESSMENT — PATIENT HEALTH QUESTIONNAIRE - PHQ9
1. LITTLE INTEREST OR PLEASURE IN DOING THINGS: NOT AT ALL
SUM OF ALL RESPONSES TO PHQ QUESTIONS 1-9: 0
SUM OF ALL RESPONSES TO PHQ9 QUESTIONS 1 & 2: 0
SUM OF ALL RESPONSES TO PHQ QUESTIONS 1-9: 0
2. FEELING DOWN, DEPRESSED OR HOPELESS: NOT AT ALL

## 2024-12-31 NOTE — ASSESSMENT & PLAN NOTE
Not at goal, increase hydrochlorothiazide to 25 mg and provided printed prescription for home BP cuff for ambulatory monitoring    Orders:    Blood Pressure Monitoring (B-D ASSURE BPM/DELUXE ARM CUFF) MISC; Use daily for blood pressure control and monitoring    hydroCHLOROthiazide (HYDRODIURIL) 25 MG tablet; Take 1 tablet by mouth daily

## 2024-12-31 NOTE — PROGRESS NOTES
Lu Vargas (:  1975) is a 49 y.o. female,Established patient, here for evaluation of the following chief complaint(s):  Hypertension         Assessment & Plan  Primary hypertension  Not at goal, increase hydrochlorothiazide to 25 mg and provided printed prescription for home BP cuff for ambulatory monitoring    Orders:    Blood Pressure Monitoring (B-D ASSURE BPM/DELUXE ARM CUFF) MISC; Use daily for blood pressure control and monitoring    hydroCHLOROthiazide (HYDRODIURIL) 25 MG tablet; Take 1 tablet by mouth daily    Chest pain, unspecified type    Given chronic, uncontrolled hypertension with history of smoking and multiple episodes of chest pain patient benefit from stress testing.  Requires chemical stress testing as she is currently in an Ortho boot    Orders:    Nuclear stress test with myocardial perfusion; Future    Shortness of breath    Reviewed differential diagnosis, possible COPD.  Given other comorbidities would appreciate initial testing with stress testing, patient agrees with plan    Orders:    Nuclear stress test with myocardial perfusion; Future      Return in about 3 months (around 3/31/2025) for HTN.       Subjective   Lu states she is taking the lab hydrochlorothiazide nightly, roughly at 10:00 at night.  Declines any issues with increased urinary frequency, no dizziness or lightheadedness.    She reports she had an episode in the last week of chest tightness and discomfort over the right aspect of the chest, that it caused her double over in pain with associated with shortness of breath.  Her friend was present for the visit states that she had great difficulty catching her breath at that time, that appeared to last a few minutes.  She is a current smoker, started smoking around the age of 14.  Denies any history of COPD.  Denies any wheezing, has noticed some mild increase in shortness of breath with basic physical activities.        Review of Systems       Objective

## 2025-01-08 ENCOUNTER — OFFICE VISIT (OUTPATIENT)
Dept: ORTHOPEDIC SURGERY | Age: 50
End: 2025-01-08
Payer: COMMERCIAL

## 2025-01-08 VITALS — HEIGHT: 61 IN | BODY MASS INDEX: 24.77 KG/M2

## 2025-01-08 DIAGNOSIS — M25.571 RIGHT ANKLE PAIN, UNSPECIFIED CHRONICITY: Primary | ICD-10-CM

## 2025-01-08 DIAGNOSIS — S82.891A CLOSED FRACTURE OF RIGHT ANKLE, INITIAL ENCOUNTER: ICD-10-CM

## 2025-01-08 PROCEDURE — 4004F PT TOBACCO SCREEN RCVD TLK: CPT | Performed by: ORTHOPAEDIC SURGERY

## 2025-01-08 PROCEDURE — G8420 CALC BMI NORM PARAMETERS: HCPCS | Performed by: ORTHOPAEDIC SURGERY

## 2025-01-08 PROCEDURE — G8427 DOCREV CUR MEDS BY ELIG CLIN: HCPCS | Performed by: ORTHOPAEDIC SURGERY

## 2025-01-08 PROCEDURE — 3017F COLORECTAL CA SCREEN DOC REV: CPT | Performed by: ORTHOPAEDIC SURGERY

## 2025-01-08 PROCEDURE — 99213 OFFICE O/P EST LOW 20 MIN: CPT | Performed by: ORTHOPAEDIC SURGERY

## 2025-01-08 NOTE — PROGRESS NOTES
[facility]  [address]  [place] [address]  Dept: [phone]  Dept Fax: [phone]        Ambulatory Follow Up    Subjective:   DOI: 11/22/24, R lateral mall fracture      HPI:    Lu Vargas is a 49 y.o. year old female who presents to our office today for routine follow-up regarding right lateral malleolus fracture.  Patient has been compliant with her cam boot use and states she only takes it off at night for sleep.  Continuing to weight-bear as tolerated.  Denies any interval trauma.  Pain is well-controlled.      Review of Systems:  Constitutional: Negative for fever and chills.   HENT: Negative for congestion.    Eyes: Negative for blurred vision and double vision.   Respiratory: Negative for cough, shortness of breath and wheezing.    Cardiovascular: Negative for chest pain and palpitations.   Gastrointestinal: Negative for nausea. Negative for vomiting.   Musculoskeletal: Positive for (right ankle pain).   Skin: Negative for itching and rash.   Neurological: Negative for dizziness, sensory change and headaches.   Psychiatric/Behavioral: Negative for depression and suicidal ideas.       Objective :   General: AAOx3, NAD, appears stated age  CV: no obvious JVD, distal pulses 2+  Respiratory: chest rise symmetric, unlabored respirations, no audible wheezing  Skin: warm, well perfused, no obvious rashes or lesions  Psych: Patient displays understanding of exam, diagnosis, and plan.    MSK-RLE:  Skin intact. Mild edema to the right ankle. TTP about the distal fibula. Able to range ankle 15 dorsiflexion, 10 plantarflexion. Pain with resisted eversion/inversion. Compartments soft and compressible. TA/EHL/FHL/GS motor intact. Deep and Superficial Peroneal/Saphenous/Sural SILT. Extremity warm and well perfused.       Radiology:   History:   Right lateral malleolus fracture    Comparison:   12/11/24    Findings:   3 views (AP,

## 2025-01-10 DIAGNOSIS — I10 PRIMARY HYPERTENSION: ICD-10-CM

## 2025-01-10 RX ORDER — HYDROCHLOROTHIAZIDE 12.5 MG/1
12.5 TABLET ORAL DAILY
Qty: 30 TABLET | Refills: 3 | OUTPATIENT
Start: 2025-01-10

## 2025-01-22 ENCOUNTER — OFFICE VISIT (OUTPATIENT)
Dept: ORTHOPEDIC SURGERY | Age: 50
End: 2025-01-22
Payer: COMMERCIAL

## 2025-01-22 VITALS — HEIGHT: 61 IN | BODY MASS INDEX: 24.77 KG/M2

## 2025-01-22 DIAGNOSIS — M25.571 RIGHT ANKLE PAIN, UNSPECIFIED CHRONICITY: Primary | ICD-10-CM

## 2025-01-22 DIAGNOSIS — S82.61XD CLOSED DISP FRACTURE OF RIGHT LATERAL MALLEOLUS WITH ROUTINE HEALING: ICD-10-CM

## 2025-01-22 PROCEDURE — 4004F PT TOBACCO SCREEN RCVD TLK: CPT

## 2025-01-22 PROCEDURE — 3017F COLORECTAL CA SCREEN DOC REV: CPT

## 2025-01-22 PROCEDURE — 99213 OFFICE O/P EST LOW 20 MIN: CPT

## 2025-01-22 PROCEDURE — G8427 DOCREV CUR MEDS BY ELIG CLIN: HCPCS

## 2025-01-22 PROCEDURE — G8420 CALC BMI NORM PARAMETERS: HCPCS

## 2025-02-19 ENCOUNTER — OFFICE VISIT (OUTPATIENT)
Dept: OBGYN | Age: 50
End: 2025-02-19

## 2025-02-19 VITALS
BODY MASS INDEX: 23.82 KG/M2 | DIASTOLIC BLOOD PRESSURE: 80 MMHG | SYSTOLIC BLOOD PRESSURE: 130 MMHG | OXYGEN SATURATION: 98 % | HEART RATE: 103 BPM | WEIGHT: 126 LBS

## 2025-02-19 DIAGNOSIS — Z00.00 WELL WOMAN EXAM (NO GYNECOLOGICAL EXAM): ICD-10-CM

## 2025-02-19 DIAGNOSIS — Z12.31 ENCOUNTER FOR SCREENING MAMMOGRAM FOR MALIGNANT NEOPLASM OF BREAST: Primary | ICD-10-CM

## 2025-02-19 NOTE — PROGRESS NOTES
OB/GYN Annual Visit  Date of patient's visit: 2025    Patient's Name:  Lu Vargas     YOB: 1975            Patient Care Team:  Luzmaria Avitia APRN - CNP as PCP - General (Family Medicine)  Luzmaria Avitia APRN - CNP as PCP - Empaneled Provider  Gilda Liang DO as Consulting Physician (General Surgery)     SUBJECTIVE:    Chief Complaint:   Chief Complaint   Patient presents with    Gynecologic Exam     Refused pelvis exam   Wants breast exam       History of Presenting Illness:    History was obtained from the patient. Lu Vargas is a 49 y.o. female     The patient was seen and examined. She is here for an annual visit. She has no complaint today.  Patient does report breast tenderness, that can be localized to the chest wall.  She has previously undergone hysterectomy, and is on estrogen patches for hormone replacement therapy.    Her No LMP recorded. Patient has had a hysterectomy.. She denies irregular/heavy bleeding and dysmenorrhea. Her periods are regular and last 3-5 days. She describes them as moderate.     Her bowel habits are regular. She denies any bloating.  She denies dysuria. She denies urinary leaking.  She denies vaginal discharge.  She is sexually active with single partner.  She uses no method for contraception and is not desiring pregnancy.    Depression Screen: Negative      Review of Systems:    An 11 point review of systems was completed    Constitutional: negative fever, negative chills  HEENT: negative visual disturbances, negative headaches  Respiratory: negative dyspnea, negative cough  Cardiovascular: negative chest pain,  negative palpitations  Gastrointestinal: negative abdominal pain, negative RUQ pain, negative N/V, negative diarrhea, negative constipation  Genitourinary: negative dysuria, negative vaginal discharge, negative vaginal bleeding  Dermatological: negative rash, negative wounds  Hematologic: negative bleeding/clotting

## 2025-02-20 NOTE — PROGRESS NOTES
Attending Physician Statement  I have discussed the care of Lu Vargas, including pertinent history and exam findings,  with the resident. I have reviewed the key elements of all parts of the encounter with the resident.  I agree with the assessment, plan and orders as documented by the resident.  (GE Modifier)    Batsheva Boss,

## 2025-03-12 ENCOUNTER — OFFICE VISIT (OUTPATIENT)
Dept: ORTHOPEDIC SURGERY | Age: 50
End: 2025-03-12
Payer: COMMERCIAL

## 2025-03-12 ENCOUNTER — OFFICE VISIT (OUTPATIENT)
Dept: PRIMARY CARE CLINIC | Age: 50
End: 2025-03-12
Payer: COMMERCIAL

## 2025-03-12 VITALS
OXYGEN SATURATION: 98 % | WEIGHT: 125 LBS | DIASTOLIC BLOOD PRESSURE: 107 MMHG | BODY MASS INDEX: 23.63 KG/M2 | TEMPERATURE: 97.3 F | HEART RATE: 91 BPM | SYSTOLIC BLOOD PRESSURE: 134 MMHG

## 2025-03-12 VITALS — BODY MASS INDEX: 23.82 KG/M2 | HEIGHT: 61 IN

## 2025-03-12 DIAGNOSIS — Z53.20 MAMMOGRAM DECLINED: ICD-10-CM

## 2025-03-12 DIAGNOSIS — Z53.20 COLON CANCER SCREENING DECLINED: ICD-10-CM

## 2025-03-12 DIAGNOSIS — M25.571 RIGHT ANKLE PAIN, UNSPECIFIED CHRONICITY: Primary | ICD-10-CM

## 2025-03-12 DIAGNOSIS — I10 PRIMARY HYPERTENSION: Primary | ICD-10-CM

## 2025-03-12 PROCEDURE — G8420 CALC BMI NORM PARAMETERS: HCPCS | Performed by: ORTHOPAEDIC SURGERY

## 2025-03-12 PROCEDURE — 99213 OFFICE O/P EST LOW 20 MIN: CPT | Performed by: ORTHOPAEDIC SURGERY

## 2025-03-12 PROCEDURE — G8427 DOCREV CUR MEDS BY ELIG CLIN: HCPCS | Performed by: ORTHOPAEDIC SURGERY

## 2025-03-12 PROCEDURE — G8420 CALC BMI NORM PARAMETERS: HCPCS | Performed by: NURSE PRACTITIONER

## 2025-03-12 PROCEDURE — 3075F SYST BP GE 130 - 139MM HG: CPT | Performed by: NURSE PRACTITIONER

## 2025-03-12 PROCEDURE — G8427 DOCREV CUR MEDS BY ELIG CLIN: HCPCS | Performed by: NURSE PRACTITIONER

## 2025-03-12 PROCEDURE — 99213 OFFICE O/P EST LOW 20 MIN: CPT | Performed by: NURSE PRACTITIONER

## 2025-03-12 PROCEDURE — 4004F PT TOBACCO SCREEN RCVD TLK: CPT | Performed by: NURSE PRACTITIONER

## 2025-03-12 PROCEDURE — 3017F COLORECTAL CA SCREEN DOC REV: CPT | Performed by: ORTHOPAEDIC SURGERY

## 2025-03-12 PROCEDURE — 3080F DIAST BP >= 90 MM HG: CPT | Performed by: NURSE PRACTITIONER

## 2025-03-12 PROCEDURE — 3017F COLORECTAL CA SCREEN DOC REV: CPT | Performed by: NURSE PRACTITIONER

## 2025-03-12 PROCEDURE — 4004F PT TOBACCO SCREEN RCVD TLK: CPT | Performed by: ORTHOPAEDIC SURGERY

## 2025-03-12 SDOH — ECONOMIC STABILITY: FOOD INSECURITY: WITHIN THE PAST 12 MONTHS, YOU WORRIED THAT YOUR FOOD WOULD RUN OUT BEFORE YOU GOT MONEY TO BUY MORE.: NEVER TRUE

## 2025-03-12 SDOH — ECONOMIC STABILITY: FOOD INSECURITY: WITHIN THE PAST 12 MONTHS, THE FOOD YOU BOUGHT JUST DIDN'T LAST AND YOU DIDN'T HAVE MONEY TO GET MORE.: NEVER TRUE

## 2025-03-12 ASSESSMENT — PATIENT HEALTH QUESTIONNAIRE - PHQ9
2. FEELING DOWN, DEPRESSED OR HOPELESS: NOT AT ALL
1. LITTLE INTEREST OR PLEASURE IN DOING THINGS: NOT AT ALL
SUM OF ALL RESPONSES TO PHQ QUESTIONS 1-9: 0

## 2025-03-12 NOTE — PROGRESS NOTES
[facility]  [address]  [place] [address]  Dept: [phone]  Dept Fax: [phone]        Orthopaedic Clinic Follow Up      Subjective:   Date of injury: 11/22/2024-right lateral malleolus fracture    Lu Vargas is a 49 y.o. year old female who presents to the clinic today for routine follow up regarding a right lateral malleolus fracture after a slip and fall on 11/22/2024 which makes her 3.5 months from her date of injury.  Patient  has been weightbearing as tolerated since the day of her injury.  Stress examination elicited no medial clear space widening.  Patient states that she is having minimal pain, but occasionally gets some sort of tenderness over the lateral malleolus.  Overall she has minimal complaints, and comes in today for reevaluation of her x-rays.  She occasionally takes Tylenol or ibuprofen for pain relief.  Denies any recent trauma. Smokes .5ppd.    Review of Systems  Gen: no fever, chills, malaise  CV: no chest pain or palpitations  Resp: no cough or shortness of breath  GI: no nausea, vomiting, diarrhea, or constipation  Neuro: no seizures, vertigo, or headache  Msk: As per HPI  10 remaining systems reviewed and negative    Objective :   There were no vitals filed for this visit.Body mass index is 23.82 kg/m².  General: No acute distress, resting comfortably in the clinic  Neuro: alert. oriented  Eyes: Extra-ocular muscles intact  Pulm: Respirations unlabored and regular.  Skin: warm, well perfused  Psych:   Patient has good fund of knowledge and displays understanding of exam, diagnosis, and plan.  MSK:  RLE:  Skin intact.  Mild TTP about the distal fibula. Able to range ankle 15 dorsiflexion, 10 plantarflexion. Pain with resisted eversion, and passive inversion.  Compartments soft and compressible. TA/EHL/FHL/GS motor intact. Deep and Superficial Peroneal/Saphenous/Sural SILT. Extremity warm and well

## 2025-03-12 NOTE — PROGRESS NOTES
[address] [place] [address]   3/12/2025    Lu Vargas is a 49 y.o. female who presents today for her medical conditions and/or complaints as noted below.    Lu Vargas is scheduled today for Hypertension  .      HPI:     History of Present Illness  The patient is a 49-year-old female here today for a hypertension follow-up.    She was last in the office on 12/31/2024, at which time hydrochlorothiazide was increased to 25 mg daily due to lack of blood pressure control, and she was prescribed a blood pressure cuff to monitor home blood pressures. She has been adhering to her daily regimen of hydrochlorothiazide 25 mg, with no adverse effects reported. She has been monitoring her blood pressure at home, which has consistently remained below 140 systolic. Her diastolic readings have not been recorded. She reports feeling well overall, with occasional tingling sensations.    A stress test was ordered previously as she was experiencing chest pain but could not complete the treadmill test due to wearing an orthopedic boot. The testing was not completed at that time. She has deferred the stress test due to the resolution of her chest pain. She experiences shortness of breath when descending stairs but does not report any associated chest pain. She also does not experience any wheezing, coughing, or chest pressure.    She has been informed that her ankle injury is healing slowly, potentially due to her smoking habit. She is not diabetic. A blood draw for vitamin D was considered but ultimately not pursued. A follow-up check is scheduled in 2.5 months, with the possibility of a stimulation test if necessary. She is able to wear regular footwear and has declined physical therapy at this time.    She has expressed disinterest in undergoing a mammogram. She has a history of abnormal Pap smears but has not pursued further investigation. She reports breast tenderness and has 
GERD

## 2025-03-19 NOTE — PROGRESS NOTES
I performed a history and physical examination of the patient and discussed management with the resident. I reviewed the /resident physician note and agree with the documented findings and plan of care. Any areas of disagreement are noted on the chart.   I have personally evaluated this patient and have completed at least one if not all key elements of the E/M (history, physical exam,procedure and MDM).  Additional findings are as noted. I agree with the chief complaint, past medical history, past surgical history, allergies, medications, social and family history as documented unless otherwise noted below.     Electronically signed by SHELTON CHILEL DO on 3/19/2025 at 7:56 AM

## 2025-04-17 DIAGNOSIS — Z90.710 HISTORY OF TOTAL ABDOMINAL HYSTERECTOMY AND BILATERAL SALPINGO-OOPHORECTOMY: ICD-10-CM

## 2025-04-17 DIAGNOSIS — Z90.79 HISTORY OF TOTAL ABDOMINAL HYSTERECTOMY AND BILATERAL SALPINGO-OOPHORECTOMY: ICD-10-CM

## 2025-04-17 DIAGNOSIS — Z90.722 HISTORY OF TOTAL ABDOMINAL HYSTERECTOMY AND BILATERAL SALPINGO-OOPHORECTOMY: ICD-10-CM

## 2025-04-17 RX ORDER — ESTRADIOL 0.1 MG/D
1 FILM, EXTENDED RELEASE TRANSDERMAL
Qty: 8 PATCH | Refills: 12 | Status: SHIPPED | OUTPATIENT
Start: 2025-04-17

## 2025-04-17 NOTE — TELEPHONE ENCOUNTER
Requested Prescriptions     Pending Prescriptions Disp Refills    estradiol (VIVELLE) 0.1 MG/24HR 8 patch 12     Sig: Place 1 patch onto the skin Twice a Week

## 2025-05-06 ENCOUNTER — HOSPITAL ENCOUNTER (EMERGENCY)
Age: 50
Discharge: HOME | End: 2025-05-06
Attending: EMERGENCY MEDICINE
Payer: COMMERCIAL

## 2025-05-06 VITALS
SYSTOLIC BLOOD PRESSURE: 136 MMHG | OXYGEN SATURATION: 100 % | RESPIRATION RATE: 16 BRPM | HEART RATE: 115 BPM | TEMPERATURE: 98.4 F | DIASTOLIC BLOOD PRESSURE: 107 MMHG

## 2025-05-06 DIAGNOSIS — K04.7 DENTAL INFECTION: Primary | ICD-10-CM

## 2025-05-06 PROCEDURE — 6370000000 HC RX 637 (ALT 250 FOR IP)

## 2025-05-06 PROCEDURE — 99283 EMERGENCY DEPT VISIT LOW MDM: CPT | Performed by: EMERGENCY MEDICINE

## 2025-05-06 RX ORDER — LEVOFLOXACIN 750 MG/1
750 TABLET, FILM COATED ORAL ONCE
Status: COMPLETED | OUTPATIENT
Start: 2025-05-06 | End: 2025-05-06

## 2025-05-06 RX ORDER — METRONIDAZOLE 500 MG/1
500 TABLET ORAL 2 TIMES DAILY
Qty: 20 TABLET | Refills: 0 | Status: SHIPPED | OUTPATIENT
Start: 2025-05-06 | End: 2025-05-16

## 2025-05-06 RX ORDER — IBUPROFEN 600 MG/1
600 TABLET, FILM COATED ORAL 4 TIMES DAILY PRN
Qty: 40 TABLET | Refills: 0 | Status: SHIPPED | OUTPATIENT
Start: 2025-05-06

## 2025-05-06 RX ORDER — METRONIDAZOLE 500 MG/1
500 TABLET ORAL ONCE
Status: COMPLETED | OUTPATIENT
Start: 2025-05-06 | End: 2025-05-06

## 2025-05-06 RX ORDER — LEVOFLOXACIN 750 MG/1
750 TABLET, FILM COATED ORAL DAILY
Qty: 7 TABLET | Refills: 0 | Status: SHIPPED | OUTPATIENT
Start: 2025-05-06 | End: 2025-05-13

## 2025-05-06 RX ORDER — IBUPROFEN 800 MG/1
800 TABLET, FILM COATED ORAL ONCE
Status: COMPLETED | OUTPATIENT
Start: 2025-05-06 | End: 2025-05-06

## 2025-05-06 RX ADMIN — IBUPROFEN 800 MG: 800 TABLET, FILM COATED ORAL at 11:33

## 2025-05-06 RX ADMIN — LEVOFLOXACIN 750 MG: 750 TABLET, FILM COATED ORAL at 11:33

## 2025-05-06 RX ADMIN — METRONIDAZOLE 500 MG: 500 TABLET ORAL at 11:33

## 2025-05-06 ASSESSMENT — ENCOUNTER SYMPTOMS
RESPIRATORY NEGATIVE: 1
GASTROINTESTINAL NEGATIVE: 1

## 2025-05-06 NOTE — ED PROVIDER NOTES
[facility] EMERGENCY DEPARTMENT  Emergency Department Encounter  Emergency Medicine Resident     Pt Name:Lu Vargas  MRN: [id number]  Birthdate 1975  Date of evaluation: 25  PCP:  Luzmaria Avitia APRN - CNP  Note Started: 11:26 AM EDT      CHIEF COMPLAINT       Chief Complaint   Patient presents with    Dental Pain       HISTORY OF PRESENT ILLNESS  (Location/Symptom, Timing/Onset, Context/Setting, Quality, Duration, Modifying Factors, Severity.)      Lu Vargas is a 50 y.o. female who presents with Left lower jaw pain and swelling for 1 day.  Patient has known dental caries to the area and states he did have dental follow-up however fell out with follow-up as did not like the practice.  Patient states that last night started having pain to the left lower jaw and awoke with swollen gum.  Came to the to have it checked out.  Patient is denying any fever, drooling, nausea, vomit, voice hoarseness.  Patient can speak in full sentences and has normal phonation.  Patient is denying any chest pain  Patient has no known medical conditions and does not take regular medications.  Patient is allergic to ampicillin states that she has swelling to her throat    PAST MEDICAL / SURGICAL / SOCIAL / FAMILY HISTORY      has a past medical history of Current smoker and Cysts of both ovaries.     has a past surgical history that includes Total abdominal hysterectomy w/ bilateral salpingoophorectomy (2019) and  section.    Social History     Socioeconomic History    Marital status: Single     Spouse name: Not on file    Number of children: Not on file    Years of education: Not on file    Highest education level: Not on file   Occupational History    Not on file   Tobacco Use    Smoking status: Every Day     Current packs/day: 1.00     Average packs/day: 1 pack/day for 20.0 years (20.0 ttl pk-yrs)     Types: Cigarettes    Smokeless tobacco: Never   Substance and Sexual Activity    Alcohol use: Yes

## 2025-05-06 NOTE — DISCHARGE INSTRUCTIONS
You were seen in the emergency department for Dental infection.You were diagnosed with dental infection without an abscess.  Please access care with a dentist as soon as possible    Please use the medications prescribed to you as instructed.    Please follow-up with your PCP in 1 day.  If you do not have a PCP please establish care with the [facility].  Information was given to make an appointment.    You are advised that sometimes early in the process of injury or disease it may be difficult to diagnose.  If you have any new or worsening symptoms in the next few days please return to the ED for further evaluation.    Please remember to fill out your visit survey sent to you.  Please tell us if we did anything good or anything particularly bad that you would like us to know about so that we could improve our care to you.    Thank you for allowing us to care for you today.     Dr. ESTEBAN Milligan

## 2025-05-06 NOTE — ED PROVIDER NOTES
[facility] EMERGENCY DEPARTMENT     Emergency Department     Faculty Attestation        I performed a history and physical examination of the patient and discussed management with the resident. I reviewed the resident’s note and agree with the documented findings and plan of care. Any areas of disagreement are noted on the chart. I was personally present for the key portions of any procedures. I have documented in the chart those procedures where I was not present during the key portions. I have reviewed the emergency nurses triage note. I agree with the chief complaint, past medical history, past surgical history, allergies, medications, social and family history as documented unless otherwise noted below.    For mid-level providers such as nurse practitioners as well as physicians assistants:    I have personally seen and evaluated the patient.    I find the patient's history and physical exam are consistent with NP/PA documentation.  I agree with the care provided, treatment rendered, disposition, & follow-up plan.     Additional findings are as noted.    Vital Signs: BP (!) 136/107   Pulse (!) 115   Temp 98.4 °F (36.9 °C)   Resp 16   SpO2 100%   PCP:  Luzmaria Avitia, APRN - CNP    Pertinent Comments:     Dental pain with no evidence of abscess or trismus.      Critical Care  None          Gio Clay MD    Attending Emergency Medicine Physician            Samuel Clay MD  05/06/25 [id number]

## 2025-05-06 NOTE — ED TRIAGE NOTES
49 yo female arrived to ED through triage with c/o dental pain.  Patient states she has a fractured tooth the left lower jaw.  Small amount of swelling noted to left lower jaw. Airways intact.   Patient is alert and oriented times 4, speaking full sentences, and answering questions appropriately

## 2025-05-14 ENCOUNTER — OFFICE VISIT (OUTPATIENT)
Dept: ORTHOPEDIC SURGERY | Age: 50
End: 2025-05-14

## 2025-05-14 VITALS — BODY MASS INDEX: 23.63 KG/M2 | HEIGHT: 61 IN

## 2025-05-14 DIAGNOSIS — M25.571 RIGHT ANKLE PAIN, UNSPECIFIED CHRONICITY: Primary | ICD-10-CM

## 2025-05-14 NOTE — PROGRESS NOTES
[facility]  [facility]  [place] [address]  Dept: [phone]  Dept Fax: [phone]        Ambulatory   Follow Up    Subjective:   Lu Vargas is a 50 year old female who presents to our office today for evaluation of their right lateral malleolus fracture.  The patient was previously seen on 3/12/2025 in our clinic at which time repeat stress examination of the right ankle did not elicit any discernible medial clear space widening.  At that time the patient did also have minimal pain to the lateral ankle.  Radiographic imaging taken at the time of that visit did demonstrate some delayed healing of the right lateral malleolus fracture, although this was attributed to the patient's extensive smoking history.  At today's visit, the patient states that she is doing very well overall.  The patient states that she has relatively no pain to the lateral aspect of the ankle.  The patient has had no difficulty with ambulation.  The patient is interested in advancing to being able to jog/run.    Review of Systems   Constitutional: Negative for Fever and Chills.   HENT: Negative for Congestion.    Eyes: Negative for Blurred Vision and Double Vision.   Respiratory: Negative for Cough, Shortness of Breath and Wheezing.    Cardiovascular: Negative for Chest Pain and Palpitations.   Gastrointestinal: Negative for Nausea. Negative for Vomiting.   Musculoskeletal: Positive for Myalgias and Joint Pain (minimal to no ankle pain).   Skin: Negative for Itching and Rash.   Neurological: Negative for Dizziness, Sensory Change and Headaches.   Psychiatric/Behavioral: Negative for Depression and Suicidal Ideas.     Objective:   General: AAOx3, NAD.  Ortho Exam  Neuro: Alert. Oriented.  Eyes: Extra-Ocular Muscles Intact.  Mouth: Oral Mucosa Moist. No Perioral Lesions.  Pulm: Respirations Unlabored and Regular.  Skin: Warm, Well Perfused.  Psych: Patient

## 2025-06-29 DIAGNOSIS — I10 PRIMARY HYPERTENSION: ICD-10-CM

## 2025-06-30 RX ORDER — HYDROCHLOROTHIAZIDE 25 MG/1
25 TABLET ORAL DAILY
Qty: 90 TABLET | Refills: 1 | Status: SHIPPED | OUTPATIENT
Start: 2025-06-30

## 2025-07-12 ENCOUNTER — HOSPITAL ENCOUNTER (EMERGENCY)
Age: 50
Discharge: HOME | End: 2025-07-12
Attending: STUDENT IN AN ORGANIZED HEALTH CARE EDUCATION/TRAINING PROGRAM
Payer: COMMERCIAL

## 2025-07-12 VITALS
HEART RATE: 102 BPM | TEMPERATURE: 98.6 F | RESPIRATION RATE: 18 BRPM | SYSTOLIC BLOOD PRESSURE: 135 MMHG | OXYGEN SATURATION: 98 % | DIASTOLIC BLOOD PRESSURE: 89 MMHG

## 2025-07-12 DIAGNOSIS — L03.113 CELLULITIS OF RIGHT UPPER EXTREMITY: Primary | ICD-10-CM

## 2025-07-12 PROCEDURE — 99283 EMERGENCY DEPT VISIT LOW MDM: CPT

## 2025-07-12 PROCEDURE — 6370000000 HC RX 637 (ALT 250 FOR IP): Performed by: STUDENT IN AN ORGANIZED HEALTH CARE EDUCATION/TRAINING PROGRAM

## 2025-07-12 RX ORDER — CEPHALEXIN 500 MG/1
500 CAPSULE ORAL ONCE
Status: COMPLETED | OUTPATIENT
Start: 2025-07-12 | End: 2025-07-12

## 2025-07-12 RX ORDER — CEPHALEXIN 500 MG/1
500 CAPSULE ORAL 3 TIMES DAILY
Qty: 21 CAPSULE | Refills: 0 | Status: SHIPPED | OUTPATIENT
Start: 2025-07-12 | End: 2025-07-19

## 2025-07-12 RX ORDER — IBUPROFEN 800 MG/1
800 TABLET, FILM COATED ORAL EVERY 8 HOURS PRN
Qty: 21 TABLET | Refills: 0 | Status: SHIPPED | OUTPATIENT
Start: 2025-07-12 | End: 2025-07-19

## 2025-07-12 RX ORDER — IBUPROFEN 800 MG/1
800 TABLET, FILM COATED ORAL ONCE
Status: COMPLETED | OUTPATIENT
Start: 2025-07-12 | End: 2025-07-12

## 2025-07-12 RX ADMIN — IBUPROFEN 800 MG: 800 TABLET, FILM COATED ORAL at 16:03

## 2025-07-12 RX ADMIN — CEPHALEXIN 500 MG: 500 CAPSULE ORAL at 16:03

## 2025-07-12 ASSESSMENT — ENCOUNTER SYMPTOMS
WHEEZING: 0
SORE THROAT: 0
TROUBLE SWALLOWING: 0
VOICE CHANGE: 0
SHORTNESS OF BREATH: 0
COLOR CHANGE: 1

## 2025-07-12 NOTE — ED PROVIDER NOTES
[facility]     Emergency Department     Faculty Attestation    I performed a history and physical examination of the patient and discussed management with the resident. I have reviewed and agree with the resident’s findings including all diagnostic interpretations, and treatment plans as written at the time of my review. Any areas of disagreement are noted on the chart. I was personally present for the key portions of any procedures. I have documented in the chart those procedures where I was not present during the key portions. For Physician Assistant/ Nurse Practitioner cases/documentation I have personally evaluated this patient and have completed at least one if not all key elements of the E/M (history, physical exam, and MDM). Additional findings are as noted.    PtName: Lu Vargas  MRN: [id number]  Birthdate 1975  Date of evaluation: 7/12/25  Note Started: 3:55 PM EDT    Primary Care Physician: Luzmaria Avitia, DAVIN - CNP    Brief HPI:  50-year-old female presents emergency department with swelling, erythema of the right wrist.  She reports that she was stung by a bee 3 days ago.  She reports that swelling has progressively worsened along with the erythema.  Denies numbness or tingling.  Denies blunt trauma.    Pertinent Physical Exam Findings:  Vitals:    07/12/25 1550   BP:    Pulse:    Resp: 18   Temp:    SpO2:    Tenderness, swelling, blanchable erythema noted to the dorsum of the right forearm.  Right upper extremity is soft and neurovascularly intact.    Medical Decision Making: Patient is a 50 y.o. female presenting to the emergency department with right upper extremity swelling and erythema. The chart was reviewed for pertinent history relating to the chief complaint.  The patient appears well at this time in no acute distress, vitals are stable.  She is afebrile.  Given the progressively worsening swelling and erythema we will plan to treat for

## 2025-07-12 NOTE — ED NOTES
Pt to ed with c/o right wrist swelling after being stung by a bee. Pt states for the past 3 days she has been using ice and taking benadryl without any relief of symptoms. Pt states she woke up today and the swelling is into her hand. Pt states it itches and burns. Pt reports no allergy to bees prior, she was stung a year ago and did not have any reactions. Pt denies sob, difficulty breathing or throat swelling. Pt rates pain 6/10

## 2025-07-12 NOTE — ED PROVIDER NOTES
[facility] EMERGENCY DEPARTMENT  Emergency Department Encounter  Emergency Medicine Resident     Pt Name:Lu Vargas  MRN: [id number]  Birthdate 1975  Date of evaluation: 25  PCP:  Luzmaria Avitia APRN - CNP  Note Started: 3:42 PM EDT    CHIEF COMPLAINT       Chief Complaint   Patient presents with    Wrist Pain     Stung by a bee      HISTORY OF PRESENT ILLNESS  (Location/Symptom, Timing/Onset, Context/Setting, Quality, Duration, Modifying Factors, Severity.)      Lu Vargas is a 50 y.o. female who presents with right arm pain and swelling after insect bite.  She states that she was stung by a wasp similar type of insect approximately 3 days ago.  She states that initially was just a small area of erythema but it seems to be spreading.  Last night with pain was to her wrist and several inches upper dorsum of her arm.  She states now it is continuing to get worse.  It also feels warm and is tender.  She denies any falls or bony injuries.  She is able to move her wrist and fingers but her wrist is sore.  She states she has been taking over-the-counter allergy medications which has not improved either of the pain or the continued swelling.    PAST MEDICAL / SURGICAL / SOCIAL / FAMILY HISTORY      has a past medical history of Current smoker and Cysts of both ovaries.     has a past surgical history that includes Total abdominal hysterectomy w/ bilateral salpingoophorectomy (2019) and  section.    Social History     Socioeconomic History    Marital status: Single     Spouse name: Not on file    Number of children: Not on file    Years of education: Not on file    Highest education level: Not on file   Occupational History    Not on file   Tobacco Use    Smoking status: Every Day     Current packs/day: 1.00     Average packs/day: 1 pack/day for 20.0 years (20.0 ttl pk-yrs)     Types: Cigarettes    Smokeless tobacco: Never   Substance and Sexual Activity    Alcohol use: Yes      7/19/25 Yes Sara Levy MD   hydroCHLOROthiazide (HYDRODIURIL) 25 MG tablet TAKE 1 TABLET BY MOUTH DAILY 6/30/25   Luzmaria Avitia APRN - CNP   estradiol (VIVELLE) 0.1 MG/24HR Place 1 patch onto the skin Twice a Week 4/17/25   Batsheva Boss,    Blood Pressure Monitoring (B-D ASSURE BPM/DELUXE ARM CUFF) MISC Use daily for blood pressure control and monitoring 12/31/24   Luzmaria Avitia APRN - CNP   acetaminophen (TYLENOL) 500 MG tablet Take 1 tablet by mouth every 6 hours as needed for Pain 11/25/24 12/5/24  Luzmaria Avitia APRN - CNP   diclofenac (VOLTAREN) 50 MG EC tablet Take 1 tablet by mouth 3 times daily (with meals)  Patient not taking: Reported on 3/12/2025 11/25/24   Luzmaria Avitia APRN - CNP   diclofenac sodium (VOLTAREN) 1 % GEL Apply 4 g topically 4 times daily as needed for Pain  Patient not taking: Reported on 3/12/2025 8/15/24   Lou Chopra MD   busPIRone (BUSPAR) 10 MG tablet take 0.5 tablet by mouth twice a day for 4 days then INCREASE 1 twice a day  Patient not taking: Reported on 1/29/2024 1/26/24   Luzmaria Avitia APRN - CNP   gabapentin (NEURONTIN) 300 MG capsule take 1 capsule by mouth three times a day 1/25/24 2/24/24  Luzmaria Avitia APRN - CNP     REVIEW OF SYSTEMS       Review of Systems   Constitutional:  Negative for chills and fever.   HENT:  Negative for sore throat, trouble swallowing and voice change.    Respiratory:  Negative for shortness of breath and wheezing.    Musculoskeletal:  Positive for arthralgias and myalgias.   Skin:  Positive for color change (erythema).       PHYSICAL EXAM      INITIAL VITALS:   /89   Pulse (!) 102   Temp 98.6 °F (37 °C)   Resp 18   SpO2 98%     Physical Exam  Constitutional:       General: She is not in acute distress.  Cardiovascular:      Rate and Rhythm: Regular rhythm. Tachycardia present.      Pulses: Normal pulses.      Heart sounds: Normal heart sounds.   Pulmonary:      Effort: Pulmonary effort is normal.

## 2025-07-12 NOTE — DISCHARGE INSTRUCTIONS
You came to the emergency department today due to concern for right arm pain and swelling following an insect bite.  Based on exam this is concerning for cellulitis which is a superficial skin infection and treated with antibiotics.  We gave you the first dose of antibiotic while you are here and I am writing a prescription for another week of antibiotics.  Please take this medication in its entirety even if your arm starts feeling better before you are done.  If you are not having any improvement in 48 or the pain, swelling, and redness starts to spread please be reevaluated.  You can be seen by your primary care doctor or come back to the emergency department if they have no appointments available.    Take your medication as indicated and prescribed.  If you are given an antibiotic then, make sure you get the prescription filled and take the antibiotics until finished.  Drink plenty of water while taking the antibiotics.  Avoid drinking alcohol or drinks that have caffeine in it while taking antibiotics.       For pain use acetaminophen (Tylenol) or ibuprofen (Motrin / Advil), unless prescribed medications that have acetaminophen or ibuprofen (or similar medications) in it.  You can take over the counter acetaminophen tablets (1 - 2 tablets of the 500-mg strength every 6 hours) or ibuprofen tablets (2 tablets every 4 hours).    PLEASE RETURN TO THE EMERGENCY DEPARTMENT IMMEDIATELY for worsening symptoms, white drainage from the wound, redness or streaking, or if you develop any concerning symptoms such as: high fever not relieved by acetaminophen (Tylenol) and/or ibuprofen (Motrin / Advil), chills, shortness of breath, chest pain, feeling of your heart fluttering or racing, persistent nausea and/or vomiting, vomiting up blood, blood in your stool, loss of consciousness, numbness, weakness or tingling in the arms or legs or change in color of the extremities, changes in mental status, persistent headache, blurry  vision, loss of bladder / bowel control, unable to follow up with your physician, or other any other care or concern.